# Patient Record
Sex: MALE | Race: WHITE
[De-identification: names, ages, dates, MRNs, and addresses within clinical notes are randomized per-mention and may not be internally consistent; named-entity substitution may affect disease eponyms.]

---

## 2018-03-25 ENCOUNTER — HOSPITAL ENCOUNTER (INPATIENT)
Dept: HOSPITAL 74 - YASAS | Age: 28
LOS: 4 days | Discharge: LEFT BEFORE DISCHARGE | DRG: 770 | End: 2018-03-29
Attending: INTERNAL MEDICINE | Admitting: INTERNAL MEDICINE
Payer: SELF-PAY

## 2018-03-25 VITALS — BODY MASS INDEX: 26.4 KG/M2

## 2018-03-25 DIAGNOSIS — F12.20: ICD-10-CM

## 2018-03-25 DIAGNOSIS — F17.210: ICD-10-CM

## 2018-03-25 DIAGNOSIS — F14.20: ICD-10-CM

## 2018-03-25 DIAGNOSIS — F10.230: ICD-10-CM

## 2018-03-25 DIAGNOSIS — G47.00: ICD-10-CM

## 2018-03-25 DIAGNOSIS — F11.23: Primary | ICD-10-CM

## 2018-03-25 PROCEDURE — HZ2ZZZZ DETOXIFICATION SERVICES FOR SUBSTANCE ABUSE TREATMENT: ICD-10-PCS | Performed by: INTERNAL MEDICINE

## 2018-03-25 NOTE — HP
COWS





- Scale


Resting Pulse: 0= MO 80 or Below


Sweatin= Chills/Flushing


Restless Observation: 5= Unable to Sit Still


Pupil Size: 1= Pupils >than Normal


Bone or Joint Aches: 4=Acute Joint/Muscle Pain


Runny Nose/ Eye Tearin= Nasal Congestion


GI Upset > 30mins: 2= Nausea/Diarrhea


Tremor Observation: 1= Tremor Felt, Not Seen


Yawning Observation: 0= None


Anxiety or Irritability: 2=Irritable/Anxious


Goose Flesh Skin: 0=Smooth Skin


COWS Score: 17





CIWA Score





- CIWA Score


Nausea/Vomitin-No Nausea/No Vomiting


Muscle Tremors: 1-None Visible, but Felt


Anxiety: 4-Mod. Anxious/Guarded


Agitation: 4-Moderately Restless


Paroxysmal Sweats: 3 (CHILLS)


Orientation: 1-Uncertain about Date


Tacttile Disturbances: 0-None


Auditory Disturbances: 0-None


Visual Disturbances: 0-None


Headache: 3-Moderate


CIWA-Ar Total Score: 16





Admission ROS S





- HPI


Chief Complaint: 





" I NEED TO STOP USING AND TO GET BETTER"


Allergies/Adverse Reactions: 


 Allergies











Allergy/AdvReac Type Severity Reaction Status Date / Time


 


No Known Allergies Allergy   Verified 18 22:54











History of Present Illness: 





27 Y.O. MALE WITH HX/O ALCOHOLISM AND HEROIN HERE FOR DETOX. CLIENT REPORTS 

THIS IS HIS FIRST TIME IN [facility]. REFERRED BY A FRIEND. DENIES ANY SIGNIFICANT 

PERIOD OF CLEAN TIME.


Exam Limitations: No Limitations





- Ebola screening


Have you traveled outside of the country in the last 21 days: No (N)


Have you had contact with anyone from an Ebola affected area: No


Have you been sick,other than usual withdrawal symptoms: No


Do you have a fever: No





- Review of Systems


Constitutional: Chills, Loss of Appetite, Malaise, Night Sweats, Changes in 

sleep


EENT: reports: Nose Congestion


Respiratory: reports: No Symptoms reported


Cardiac: reports: No Symptoms Reported


GI: reports: Diarrhea, Poor Appetite, Poor Fluid Intake, Abdominal cramping


: reports: No Symptoms Reported


Musculoskeletal: reports: Back Pain


Integumentary: reports: No Symptoms Reported


Neuro: reports: No Symptoms reported


Endocrine: reports: No Symptoms Reported


Hematology: reports: No Symptoms Reported


Psychiatric: reports: Anxious, Depressed


Other Systems: Reviewed and Negative





Patient History





- Patient Medical History


Hx Anemia: No


Hx Asthma: No


Hx Chronic Obstructive Pulmonary Disease (COPD): No


Hx Cancer: No


Hx Cardiac Disorders: No


Hx Congestive Heart Failure: No


Hx Hypertension: No


Hx Hypercholesterolemia: No


Hx Pacemaker: No


HX Cerebrovascular Accident: No


Hx Seizures: No


Hx Dementia: No


Hx Diabetes: No


Hx Gastrointestinal Disorders: No


Hx Liver Disease: No


Hx Genitourinary Disorders: No


Hx Sexually Transmitted Disorders: No


Hx Renal Disease (ESRD): No


Hx Thyroid Disease: No


Hx Human Immunodeficiency Virus (HIV): No (Last tested approx. 2 months ago: 

NEGATIVE.)


Hx Hepatitis C: No (Last tested approx. 2 months ago: NEGATIVE.)


Hx Depression: No


Hx Suicide Attempt: No (PATIENT DENIES CURRENT SI / HI.)


Hx Bipolar Disorder: No


Hx Schizophrenia: No


Other Medical History: ANXIETY DECLINES PSYCH EVAL





- Patient Surgical History


Past Surgical History: No


Hx Neurologic Surgery: No


Hx Cataract Extraction: No


Hx Cardiac Surgery: No


Hx Lung Surgery: No


Hx Breast Surgery: No


Hx Breast Biopsy: No


Hx Abdominal Surgery: No


Hx Appendectomy: No


Hx Cholecystectomy: No


Hx Genitourinary Surgery: No


Hx Orthopedic Surgery: No


Anesthesia Reaction: No





- PPD History


Previous Implant?: Yes


Documented Results: Negative w/o proof


Implanted On Prior [facility] Admission?: No


PPD to be Administered?: Yes





- Smoking Cessation


Smoking history: Current every day smoker


Have you smoked in the past 12 months: Yes


Aproximately how many cigarettes per day: 20


Cigars Per Day: 0


Hx Chewing Tobacco Use: No


Initiated information on smoking cessation: Yes


'Breaking Loose' booklet given: 18





- Substance & Tx. History


Hx Alcohol Use: Yes


Hx Substance Use: Yes


Substance Use Type: Alcohol, Heroin


Hx Substance Use Treatment: No





- Substances Abused


  ** Heroin


Route: Inhalation


Frequency: Daily


Amount used: 10 BAGS


Age of first use: 26


Date of Last Use: 18





  ** Alcohol


Route: Oral


Frequency: Daily


Amount used: BEER 1 OF 6 PACK


Age of first use: 18


Date of Last Use: 18





  ** Marijuana/Hashish


Route: Smoking


Frequency: Daily


Amount used: 2 GRAMS


Age of first use: 18


Date of Last Use: 18





Family Disease History





- Family Disease History


Family Disease History: Diabetes: Grandparent, Sister





Admission Physical Exam [facility]





- Vital Signs


Vital Signs: 


 Vital Signs - 24 hr











  18





  21:03


 


Temperature 98.7 F


 


Pulse Rate 75


 


Respiratory 19





Rate 


 


Blood Pressure 129/72














- Physical


General Appearance: Yes: Mild Distress, Tremorous, Anxious


HEENTM: Yes: EOMI, Normocephalic, PEREZ, Pharynx Normal, Nasal Congestion


Respiratory: Yes: Chest Non-Tender, Lungs Clear, Normal Breath Sounds, No 

Respiratory Distress, No Accessory Muscle Use


Neck: Yes: No masses,lesions,Nodules, Supple, Trachea in good position


Breast: Yes: Breast Exam Deferred


Cardiology: Yes: Regular Rhythm, Regular Rate, S1, S2


Abdominal: Yes: Normal Bowel Sounds, Non Tender, Flat, Soft


Genitourinary: Yes: Within Normal Limits


Back: Yes: Normal Inspection


Musculoskeletal: Yes: Gait Steady


Extremities: Yes: Normal Range of Motion, Non-Tender, Tremors


Neurological: Yes: Alert, Motor Strength 5/5


Integumentary: Yes: Normal Color, Dry, Warm


Lymphatic: Yes: Within Normal Limits





- Diagnostic


(1) Alcohol dependence with uncomplicated withdrawal


Current Visit: Yes   Status: Acute   





(2) Opioid dependence with withdrawal


Current Visit: Yes   Status: Acute   





(3) Nicotine dependence


Current Visit: Yes   Status: Chronic   


Qualifiers: 


   Nicotine product type: cigarettes   Substance use status: uncomplicated   

Qualified Code(s): F17.210 - Nicotine dependence, cigarettes, uncomplicated   





Cleared for Admission [facility]





- Detox or Rehab


[facility] Level of Care: Medically Managed


Detox Regimen/Protocol: Methadone/Librium





[facility] Breath Alcohol Content


Breath Alcohol Content: 0





Urine Drug Screen





- Results


Drug Screen Negative: No


Urine Drug Screen Results: THC-Marijuana, OPI-Opiates

## 2018-03-26 LAB
ALBUMIN SERPL-MCNC: 3.7 G/DL (ref 3.4–5)
ALP SERPL-CCNC: 68 U/L (ref 45–117)
ALT SERPL-CCNC: 22 U/L (ref 12–78)
ANION GAP SERPL CALC-SCNC: 10 MMOL/L (ref 8–16)
APPEARANCE UR: CLEAR
AST SERPL-CCNC: 12 U/L (ref 15–37)
BILIRUB SERPL-MCNC: 0.6 MG/DL (ref 0.2–1)
BILIRUB UR STRIP.AUTO-MCNC: NEGATIVE MG/DL
BUN SERPL-MCNC: 14 MG/DL (ref 7–18)
CALCIUM SERPL-MCNC: 8.9 MG/DL (ref 8.5–10.1)
CHLORIDE SERPL-SCNC: 103 MMOL/L (ref 98–107)
CO2 SERPL-SCNC: 28 MMOL/L (ref 21–32)
COLOR UR: YELLOW
CREAT SERPL-MCNC: 0.8 MG/DL (ref 0.7–1.3)
DEPRECATED RDW RBC AUTO: 13.6 % (ref 11.9–15.9)
GLUCOSE SERPL-MCNC: 80 MG/DL (ref 74–106)
HCT VFR BLD CALC: 42.6 % (ref 35.4–49)
HGB BLD-MCNC: 14.5 GM/DL (ref 11.7–16.9)
KETONES UR QL STRIP: NEGATIVE
LEUKOCYTE ESTERASE UR QL STRIP.AUTO: NEGATIVE
MCH RBC QN AUTO: 29.9 PG (ref 25.7–33.7)
MCHC RBC AUTO-ENTMCNC: 34 G/DL (ref 32–35.9)
MCV RBC: 87.8 FL (ref 80–96)
NITRITE UR QL STRIP: NEGATIVE
PH UR: 5 [PH] (ref 5–8)
PLATELET # BLD AUTO: 186 K/MM3 (ref 134–434)
PMV BLD: 10.7 FL (ref 7.5–11.1)
POTASSIUM SERPLBLD-SCNC: 4.1 MMOL/L (ref 3.5–5.1)
PROT SERPL-MCNC: 7.1 G/DL (ref 6.4–8.2)
PROT UR QL STRIP: NEGATIVE
PROT UR QL STRIP: NEGATIVE
RBC # BLD AUTO: 4.85 M/MM3 (ref 4–5.6)
RBC # UR STRIP: NEGATIVE /UL
SODIUM SERPL-SCNC: 141 MMOL/L (ref 136–145)
SP GR UR: 1.02 (ref 1–1.03)
UROBILINOGEN UR STRIP-MCNC: 2 MG/DL (ref 0.2–1)
WBC # BLD AUTO: 8.7 K/MM3 (ref 4–10)

## 2018-03-26 RX ADMIN — ACETAMINOPHEN PRN MG: 325 TABLET ORAL at 22:36

## 2018-03-26 RX ADMIN — Medication SCH TAB: at 10:41

## 2018-03-26 RX ADMIN — NICOTINE SCH: 14 PATCH, EXTENDED RELEASE TRANSDERMAL at 10:42

## 2018-03-26 RX ADMIN — Medication SCH: at 22:55

## 2018-03-26 NOTE — PN
S CIWA





- CIWA Score


Nausea/Vomiting: 3


Muscle Tremors: 3


Anxiety: 3


Agitation: 3


Paroxysmal Sweats: 1-Minimal Palms Moist


Orientation: 0-Oriented


Tacttile Disturbances: 1-Very Mild Itch/Numbness


Auditory Disturbances: 1-Very Mild


Visual Disturbances: 0-None


Headache: 2-Mild


CIWA-Ar Total Score: 17





BHS COWS





- Scale


Resting Pulse: 1= MI 


Sweatin= Chills/Flushing


Restless Observation: 3= Extraneous Movement


Pupil Size: 1= Pupils >than Normal


Bone or Joint Aches: 2= Severe Diffuse Aches


Runny Nose/ Eye Tearin= Nasal Congestion


GI Upset > 30mins: 2= Nausea/Diarrhea


Tremor Observation of Outstretched Hands: 2= Slight Tremor Visible


Yawning Observation: 1= 1-2x During Session


Anxiety or Irritability: 2=Irritable/Anxious


Goose Flesh Skin: 0=Smooth Skin


COWS Score: 16





BHS Progress Note (SOAP)


Subjective: 





ALERT,IRRITABLE.ANXIOUS,INTERRUPTED SLEEP,TREMOR


Objective: 





18 10:38


 Vital Signs











Temperature  98.1 F   18 09:45


 


Pulse Rate  92 H  18 09:45


 


Respiratory Rate  16   18 09:45


 


Blood Pressure  116/72   18 09:45


 


O2 Sat by Pulse Oximetry (%)      








EKG NSR,NORMAL ECG


18 10:39


 Laboratory Last Values











WBC  8.7 K/mm3 (4.0-10.0)   18  07:00    


 


RBC  4.85 M/mm3 (4.00-5.60)   18  07:00    


 


Hgb  14.5 GM/dL (11.7-16.9)   18  07:00    


 


Hct  42.6 % (35.4-49)   18  07:00    


 


MCV  87.8 fl (80-96)   18  07:00    


 


MCH  29.9 pg (25.7-33.7)   18  07:00    


 


MCHC  34.0 g/dl (32.0-35.9)   18  07:00    


 


RDW  13.6 % (11.9-15.9)   18  07:00    


 


Plt Count  186 K/MM3 (134-434)   18  07:00    


 


MPV  10.7 fl (7.5-11.1)   18  07:00    


 


Sodium  141 mmol/L (136-145)   18  07:00    


 


Potassium  4.1 mmol/L (3.5-5.1)   18  07:00    


 


Chloride  103 mmol/L ()   18  07:00    


 


Carbon Dioxide  28 mmol/L (21-32)   18  07:00    


 


Anion Gap  10  (8-16)   18  07:00    


 


BUN  14 mg/dL (7-18)  D 18  07:00    


 


Creatinine  0.8 mg/dL (0.7-1.3)   18  07:00    


 


Creat Clearance w eGFR  > 60  (>60)   18  07:00    


 


Random Glucose  80 mg/dL ()   18  07:00    


 


Calcium  8.9 mg/dL (8.5-10.1)   18  07:00    


 


Total Bilirubin  0.6 mg/dL (0.2-1.0)  D 18  07:00    


 


AST  12 U/L (15-37)  L  18  07:00    


 


ALT  22 U/L (12-78)   18  07:00    


 


Total Protein  7.1 g/dl (6.4-8.2)   18  07:00    


 


Albumin  3.7 g/dl (3.4-5.0)   18  07:00    


 


Urine Color  Yellow   18  23:50    


 


Urine Appearance  Clear   18  23:50    


 


Urine pH  5.0  (5.0-8.0)  D 18  23:50    


 


Ur Specific Gravity  1.025  (1.001-1.035)   18  23:50    


 


Urine Protein  Negative  (NEGATIVE)   18  23:50    


 


Urine Glucose (UA)  Negative  (NEGATIVE)   18  23:50    


 


Urine Ketones  Negative  (NEGATIVE)   18  23:50    


 


Urine Blood  Negative  (NEGATIVE)   18  23:50    


 


Urine Nitrite  Negative  (NEGATIVE)   18  23:50    


 


Urine Bilirubin  Negative  (<2.0 mg/dL)   18  23:50    


 


Urine Urobilinogen  2.0 mg/dL (0.2-1.0)   18  23:50    


 


Ur Leukocyte Esterase  Negative  (NEGATIVE)   18  23:50    











18 10:39


RPR HIV PENDING


Assessment: 





18 10:39


WITHDRAWAL SYMPTOM


Plan: 





CONTINUE DETOX

## 2018-03-26 NOTE — EKG
Test Reason : 

Blood Pressure : ***/*** mmHG

Vent. Rate : 074 BPM     Atrial Rate : 074 BPM

   P-R Int : 176 ms          QRS Dur : 094 ms

    QT Int : 394 ms       P-R-T Axes : 067 069 028 degrees

   QTc Int : 437 ms

 

NORMAL SINUS RHYTHM

NORMAL ECG

NO PREVIOUS ECGS AVAILABLE

Confirmed by ANA LUISA LANDA MD ([id number]) on 3/26/2018 9:05:52 AM

 

Referred By:             Confirmed By:ANA LUISA LANDA MD

## 2018-03-27 RX ADMIN — NICOTINE SCH: 14 PATCH, EXTENDED RELEASE TRANSDERMAL at 10:31

## 2018-03-27 RX ADMIN — Medication SCH MG: at 22:33

## 2018-03-27 RX ADMIN — Medication SCH TAB: at 10:30

## 2018-03-27 RX ADMIN — CYCLOBENZAPRINE HYDROCHLORIDE PRN MG: 10 TABLET, FILM COATED ORAL at 10:30

## 2018-03-27 RX ADMIN — ZOLPIDEM TARTRATE PRN MG: 10 TABLET, FILM COATED ORAL at 22:35

## 2018-03-27 NOTE — PN
S CIWA





- CIWA Score


Nausea/Vomiting: 3


Muscle Tremors: 3


Anxiety: 3


Agitation: 3


Paroxysmal Sweats: 1-Minimal Palms Moist


Orientation: 0-Oriented


Tacttile Disturbances: 1-Very Mild Itch/Numbness


Auditory Disturbances: 1-Very Mild


Visual Disturbances: 0-None


Headache: 2-Mild


CIWA-Ar Total Score: 17





BHS Progress Note (SOAP)


Subjective: 





ALERT,IRRITABLE,ANXIOUS,INTERRUPTED SLEEP,TREMOR,PAIN IN THE BODY AND BACK


Objective: 





03/27/18 11:21


 Vital Signs











Temperature  98.1 F   03/27/18 09:58


 


Pulse Rate  92 H  03/27/18 09:58


 


Respiratory Rate  18   03/27/18 09:58


 


Blood Pressure  135/89   03/27/18 09:58


 


O2 Sat by Pulse Oximetry (%)      








EKG NSR,NORMAL ECG,


 Laboratory Last Values











WBC  8.7 K/mm3 (4.0-10.0)   03/26/18  07:00    


 


RBC  4.85 M/mm3 (4.00-5.60)   03/26/18  07:00    


 


Hgb  14.5 GM/dL (11.7-16.9)   03/26/18  07:00    


 


Hct  42.6 % (35.4-49)   03/26/18  07:00    


 


MCV  87.8 fl (80-96)   03/26/18  07:00    


 


MCH  29.9 pg (25.7-33.7)   03/26/18  07:00    


 


MCHC  34.0 g/dl (32.0-35.9)   03/26/18  07:00    


 


RDW  13.6 % (11.9-15.9)   03/26/18  07:00    


 


Plt Count  186 K/MM3 (134-434)   03/26/18  07:00    


 


MPV  10.7 fl (7.5-11.1)   03/26/18  07:00    


 


Sodium  141 mmol/L (136-145)   03/26/18  07:00    


 


Potassium  4.1 mmol/L (3.5-5.1)   03/26/18  07:00    


 


Chloride  103 mmol/L ()   03/26/18  07:00    


 


Carbon Dioxide  28 mmol/L (21-32)   03/26/18  07:00    


 


Anion Gap  10  (8-16)   03/26/18  07:00    


 


BUN  14 mg/dL (7-18)  D 03/26/18  07:00    


 


Creatinine  0.8 mg/dL (0.7-1.3)   03/26/18  07:00    


 


Creat Clearance w eGFR  > 60  (>60)   03/26/18  07:00    


 


Random Glucose  80 mg/dL ()   03/26/18  07:00    


 


Calcium  8.9 mg/dL (8.5-10.1)   03/26/18  07:00    


 


Total Bilirubin  0.6 mg/dL (0.2-1.0)  D 03/26/18  07:00    


 


AST  12 U/L (15-37)  L  03/26/18  07:00    


 


ALT  22 U/L (12-78)   03/26/18  07:00    


 


Alkaline Phosphatase  68 U/L ()   03/26/18  07:00    


 


Total Protein  7.1 g/dl (6.4-8.2)   03/26/18  07:00    


 


Albumin  3.7 g/dl (3.4-5.0)   03/26/18  07:00    


 


Urine Color  Yellow   03/25/18  23:50    


 


Urine Appearance  Clear   03/25/18  23:50    


 


Urine pH  5.0  (5.0-8.0)  D 03/25/18  23:50    


 


Ur Specific Gravity  1.025  (1.001-1.035)   03/25/18  23:50    


 


Urine Protein  Negative  (NEGATIVE)   03/25/18  23:50    


 


Urine Glucose (UA)  Negative  (NEGATIVE)   03/25/18  23:50    


 


Urine Ketones  Negative  (NEGATIVE)   03/25/18  23:50    


 


Urine Blood  Negative  (NEGATIVE)   03/25/18  23:50    


 


Urine Nitrite  Negative  (NEGATIVE)   03/25/18  23:50    


 


Urine Bilirubin  Negative  (<2.0 mg/dL)   03/25/18  23:50    


 


Urine Urobilinogen  2.0 mg/dL (0.2-1.0)   03/25/18  23:50    


 


Ur Leukocyte Esterase  Negative  (NEGATIVE)   03/25/18  23:50    


 


RPR Titer  Nonreactive  (NONREACTIVE)   03/26/18  07:00    


 


HIV 1&2 Antibody Screen  Negative   03/26/18  07:00    


 


HIV P24 Antigen  Negative   03/26/18  07:00    











Assessment: 





03/27/18 11:22


WITHDRAWAL SYMPTOM


Plan: 





CONTINUE DETOX,ENSURE PLUS 120 MLS PO BID AND GINGERALE TID WITH EACH MEAL

## 2018-03-27 NOTE — CONSULT
[facility] Psychiatric Consult





- Data


Date of interview: 03/27/18


Admission source: [facility]


Identifying data: Pt. is a 27 year old single male, Father of two, unemployed, 

and currently living with mother. This is one of multiple admissions for 

patient. Pt. admitted to  for alcohol, cannabis and heroin.


Substance Abuse History: Following information confirmed with Mr. Arreola:  - 

Smoking Cessation.  Smoking history: Current every day smoker.  Have you smoked 

in the past 12 months: Yes.  Aproximately how many cigarettes per day: 20.  

Cigars Per Day: 0.  Hx Chewing Tobacco Use: No.  Initiated information on 

smoking cessation: Yes.  - Substance & Tx. History.  Hx Alcohol Use: Yes.  Hx 

Substance Use: Yes.  Substance Use Type: Alcohol, Heroin.  Hx Substance Use 

Treatment: No.  - Substances Abused.  ** Heroin.  Route: Inhalation.  Frequency

: Daily.  Amount used: 10 BAGS.  Age of first use: 26.  Date of Last Use: 03/25/ 18.  ** Alcohol.  Route: Oral.  Frequency: Daily.  Amount used: BEER 1 OF 6 

PACK.  Age of first use: 18.  Date of Last Use: 03/23/18.  ** Marijuana/

Hashish.  Route: Smoking.  Frequency: Daily.  Amount used: 2 GRAMS.  Age of 

first use: 18.  Date of Last Use: 03/25/18


Psychiatric History: Pt. denies h/o psychiatric hospitalizations, suicide 

attempt, and outpatient care.


Physical/Sexual Abuse/Trauma History: Denies.





Mental Status Exam





- Mental Status Exam


Alert and Oriented to: Time, Place, Person


Cognitive Function: Good


Patient Appearance: Unkempt


Mood: Euthymic


Affect: Appropriate


Patient Behavior: Fatigued (Pt. reports poor sleep.), Appropriate, Cooperative


Speech Pattern: Appropriate


Voice Loudness: Normal


Thought Process: Goal Oriented


Thought Disorder: Not Present


Hallucinations: Denies


Suicidal Ideation: Denies


Homicidal Ideation: Denies


Insight/Judgement: Poor


Sleep: Poorly


Appetite: Fair


Muscle strength/Tone: Normal


Gait/Station: Normal





Psychiatric Findings





- Problem List (Axis 1, 2,3)


(1) Insomnia


Current Visit: Yes   Status: Acute   





(2) Alcohol dependence with uncomplicated withdrawal


Current Visit: Yes   Status: Acute   





(3) Cannabis dependence, uncomplicated


Current Visit: Yes   Status: Acute   





(4) Cocaine dependence, uncomplicated


Current Visit: Yes   Status: Acute   





(5) Nicotine dependence


Current Visit: Yes   Status: Chronic   


Qualifiers: 


   Nicotine product type: cigarettes   Substance use status: uncomplicated   

Qualified Code(s): F17.210 - Nicotine dependence, cigarettes, uncomplicated   





(6) Opioid dependence with withdrawal


Current Visit: Yes   Status: Acute   





- Initial Treatment Plan


Initial Treatment Plan: Psychoeducation provided. Detoxification provided. 

Ambien 10mg qhs prn ordered for insomnia. Benefits and side effects discussed. 

Pt made aware of the risk of parasomnia. Verbal consent given. Will continue to 

monitor.

## 2018-03-28 VITALS — TEMPERATURE: 97.7 F

## 2018-03-28 RX ADMIN — ZOLPIDEM TARTRATE PRN MG: 10 TABLET, FILM COATED ORAL at 22:38

## 2018-03-28 RX ADMIN — Medication SCH TAB: at 11:15

## 2018-03-28 RX ADMIN — NICOTINE SCH: 14 PATCH, EXTENDED RELEASE TRANSDERMAL at 11:18

## 2018-03-28 RX ADMIN — Medication SCH MG: at 22:38

## 2018-03-28 RX ADMIN — CYCLOBENZAPRINE HYDROCHLORIDE PRN MG: 10 TABLET, FILM COATED ORAL at 11:15

## 2018-03-28 NOTE — PN
[facility] Progress Note (SOAP)


Subjective: 





ALERT,IRRITABLE,ANXIOUS,INTERRUPTED SLEEP,PAIN IN THE BODY AND BACK


Objective: 





03/28/18 10:16


 Vital Signs











Temperature  97.5 F L  03/28/18 06:33


 


Pulse Rate  68   03/28/18 06:33


 


Respiratory Rate  16   03/28/18 06:33


 


Blood Pressure  113/68   03/28/18 06:33


 


O2 Sat by Pulse Oximetry (%)      











Assessment: 





03/28/18 10:17


WITHDRAWAL SYMPTOM BUT LESS


Plan: 





CONTINUE DETOX,MEDICATION ADJUST,

## 2018-03-29 VITALS — HEART RATE: 98 BPM | DIASTOLIC BLOOD PRESSURE: 69 MMHG | SYSTOLIC BLOOD PRESSURE: 103 MMHG

## 2018-03-29 RX ADMIN — ACETAMINOPHEN PRN MG: 325 TABLET ORAL at 08:46

## 2018-03-29 NOTE — PN
BHS Progress Note (SOAP)


Subjective: 





ALERT,INTERRUPTED SLEEP


Objective: 





03/29/18 09:04


 Vital Signs











Temperature  97.7 F   03/29/18 06:00


 


Pulse Rate  72   03/29/18 06:00


 


Respiratory Rate  18   03/29/18 06:00


 


Blood Pressure  105/59   03/29/18 06:00


 


O2 Sat by Pulse Oximetry (%)      











Assessment: 





03/29/18 09:04


WITHDRAWAL SYMPTOM


Plan: 





CONTINUE DETOX

## 2018-03-29 NOTE — DS
[facility] Detox Discharge Summary


Admission Date: 


18





Discharge Date: 18





- History


Present History: Alcohol Dependence, Cannabis Dependence, Cocaine Dependence, 

Opioid Dependence


Additional Comments: 





PATIENT DID NOT WANT TO COMPLETE TREATMENT,SIGNED RELEASE AMA,STATED HAS TO GO 

TO  OF HIS AUNT,SEEN BY COUNSELOR,SIGNED RELEASE AMA,FOLLOW UP WITH 

AFTER CARE PROGRAM AS ARRANGEMENT


Pertinent Past History: 





INSOMNIA





- Physical Exam Results


Vital Signs: 


 Vital Signs











Temperature  97.7 F   18 06:00


 


Pulse Rate  72   18 06:00


 


Respiratory Rate  18   18 06:00


 


Blood Pressure  105/59   18 06:00


 


O2 Sat by Pulse Oximetry (%)      











Pertinent Admission Physical Exam Findings: 





WITHDRAWAL SYMPTOM AND SIGNS


 Laboratory Last Values











WBC  8.7 K/mm3 (4.0-10.0)   18  07:00    


 


RBC  4.85 M/mm3 (4.00-5.60)   18  07:00    


 


Hgb  14.5 GM/dL (11.7-16.9)   18  07:00    


 


Hct  42.6 % (35.4-49)   18  07:00    


 


MCV  87.8 fl (80-96)   18  07:00    


 


MCH  29.9 pg (25.7-33.7)   18  07:00    


 


MCHC  34.0 g/dl (32.0-35.9)   18  07:00    


 


RDW  13.6 % (11.9-15.9)   18  07:00    


 


Plt Count  186 K/MM3 (134-434)   18  07:00    


 


MPV  10.7 fl (7.5-11.1)   18  07:00    


 


Sodium  141 mmol/L (136-145)   18  07:00    


 


Potassium  4.1 mmol/L (3.5-5.1)   18  07:00    


 


Chloride  103 mmol/L ()   18  07:00    


 


Carbon Dioxide  28 mmol/L (21-32)   18  07:00    


 


Anion Gap  10  (8-16)   18  07:00    


 


BUN  14 mg/dL (7-18)  D 18  07:00    


 


Creatinine  0.8 mg/dL (0.7-1.3)   18  07:00    


 


Creat Clearance w eGFR  > 60  (>60)   18  07:00    


 


Random Glucose  80 mg/dL ()   18  07:00    


 


Calcium  8.9 mg/dL (8.5-10.1)   18  07:00    


 


Total Bilirubin  0.6 mg/dL (0.2-1.0)  D 18  07:00    


 


AST  12 U/L (15-37)  L  18  07:00    


 


ALT  22 U/L (12-78)   18  07:00    


 


Alkaline Phosphatase  68 U/L ()   18  07:00    


 


Total Protein  7.1 g/dl (6.4-8.2)   18  07:00    


 


Albumin  3.7 g/dl (3.4-5.0)   18  07:00    


 


Urine Color  Yellow   18  23:50    


 


Urine Appearance  Clear   18  23:50    


 


Urine pH  5.0  (5.0-8.0)  D 18  23:50    


 


Ur Specific Gravity  1.025  (1.001-1.035)   18  23:50    


 


Urine Protein  Negative  (NEGATIVE)   18  23:50    


 


Urine Glucose (UA)  Negative  (NEGATIVE)   18  23:50    


 


Urine Ketones  Negative  (NEGATIVE)   18  23:50    


 


Urine Blood  Negative  (NEGATIVE)   18  23:50    


 


Urine Nitrite  Negative  (NEGATIVE)   18  23:50    


 


Urine Bilirubin  Negative  (<2.0 mg/dL)   18  23:50    


 


Urine Urobilinogen  2.0 mg/dL (0.2-1.0)   18  23:50    


 


Ur Leukocyte Esterase  Negative  (NEGATIVE)   18  23:50    


 


RPR Titer  Nonreactive  (NONREACTIVE)   18  07:00    


 


HIV 1&2 Antibody Screen  Negative   18  07:00    


 


HIV P24 Antigen  Negative   18  07:00    








 Vital Signs











Temperature  97.7 F   18 06:00


 


Pulse Rate  72   18 06:00


 


Respiratory Rate  18   18 06:00


 


Blood Pressure  105/59   18 06:00


 


O2 Sat by Pulse Oximetry (%)      














- Treatment


Patient has Accepted a Rehab Referral to: [facility]





- Medication


Discharge Medications: 


Ambulatory Orders





NK [No Known Home Medication]  10/26/17 











- Diagnosis


(1) Opioid dependence with withdrawal


Current Visit: Yes   Status: Acute   





(2) Alcohol dependence with uncomplicated withdrawal


Current Visit: Yes   Status: Acute   





(3) Cannabis dependence, uncomplicated


Current Visit: Yes   Status: Acute   





(4) Cocaine dependence, uncomplicated


Current Visit: Yes   Status: Acute   





(5) Nicotine dependence


Current Visit: Yes   Status: Chronic   


Qualifiers: 


   Nicotine product type: cigarettes   Substance use status: uncomplicated   

Qualified Code(s): F17.210 - Nicotine dependence, cigarettes, uncomplicated   





(6) Insomnia


Current Visit: Yes   Status: Acute   





- AMA


Did Patient Leave Against Medical Advice: Yes

## 2018-11-28 ENCOUNTER — HOSPITAL ENCOUNTER (INPATIENT)
Dept: HOSPITAL 74 - YASAS | Age: 28
LOS: 3 days | Discharge: LEFT BEFORE DISCHARGE | DRG: 770 | End: 2018-12-01
Attending: INTERNAL MEDICINE | Admitting: INTERNAL MEDICINE
Payer: COMMERCIAL

## 2018-11-28 VITALS — BODY MASS INDEX: 25.8 KG/M2

## 2018-11-28 DIAGNOSIS — F17.210: ICD-10-CM

## 2018-11-28 DIAGNOSIS — F14.20: ICD-10-CM

## 2018-11-28 DIAGNOSIS — F11.23: Primary | ICD-10-CM

## 2018-11-28 DIAGNOSIS — F10.230: ICD-10-CM

## 2018-11-28 DIAGNOSIS — F12.20: ICD-10-CM

## 2018-11-28 NOTE — HP
COWS





- Scale


Resting Pulse: 1= CA 


Sweatin=Flushed/Facial Moisture


Restless Observation: 1= Difficult to Sit Still


Pupil Size: 0= Normal to Room Light


Bone or Joint Aches: 4=Acute Joint/Muscle Pain


Runny Nose/ Eye Tearin= Runny Nose/Eyes


GI Upset > 30mins: 2= Nausea/Diarrhea (X 2)


Tremor Observation: 4= Gross Tremor/Twitching


Yawning Observation: 0= None


Anxiety or Irritability: 2=Irritable/Anxious


Goose Flesh Skin: 0=Smooth Skin


COWS Score: 18





CIWA Score


Nausea/Vomiting: 3 (VOMITING X 2)


Muscle Tremors: 4-Moderate,w/Arms Extend


Anxiety: 1-Mildly Anxious


Agitation: 3


Paroxysmal Sweats: 1-Minimal Palms Moist


Orientation: 0-Oriented


Tacttile Disturbances: 0-None


Auditory Disturbances: 3-Moderate Harsh/Frighten


Visual Disturbances: 0-None


Headache: 4-Moderately Severe


CIWA-Ar Total Score: 19





- Admission Criteria


[facility] Guidelines: Admission for Medically Managed Detox: 


Requires at least one of the followin. CIWA greater than 12


2. Seizures within the past 24 hours


3. Delirium tremens within the past 24 hours


4. Hallucinations within the past 24 hours


5. Acute intervention needed for co  occurring medical disorder


6. Acute intervention needed for co  occurring psychiatric disorder


7. Severe withdrawal that cannot be handled at a lower level of care (continued


    vomiting, continued diarrhea, abnormal vital signs) requiring intravenous


    medication and/or fluids


8. Pregnancy








Admission ROS [facility]


Allergies/Adverse Reactions: 


 Allergies











Allergy/AdvReac Type Severity Reaction Status Date / Time


 


No Known Allergies Allergy   Verified 18 23:07














- Ebola screening


Have you traveled outside of the country in the last 21 days: No (N)


Have you had contact with anyone from an Ebola affected area: No


Have you been sick,other than usual withdrawal symptoms: No


Do you have a fever: No





Patient History





- Patient Medical History


Hx Anemia: No


Hx Asthma: No


Hx Chronic Obstructive Pulmonary Disease (COPD): No


Hx Cancer: No


Hx Cardiac Disorders: No


Hx Congestive Heart Failure: No


Hx Hypertension: No


Hx Hypercholesterolemia: No


Hx Pacemaker: No


HX Cerebrovascular Accident: No


Hx Seizures: No


Hx Dementia: No


Hx Diabetes: No


Hx Gastrointestinal Disorders: No


Hx Liver Disease: No


Hx Genitourinary Disorders: No


Hx Sexually Transmitted Disorders: No


Hx Renal Disease (ESRD): No


Hx Thyroid Disease: No


Hx Human Immunodeficiency Virus (HIV): No (Last tested approx. 2 months ago: 

NEGATIVE.)


Hx Hepatitis C: No (Last tested approx. 2 months ago: NEGATIVE.)


Hx Depression: No


Hx Suicide Attempt: No (PATIENT DENIES CURRENT SI / HI.)


Hx Bipolar Disorder: No


Hx Schizophrenia: No





- Patient Surgical History


Past Surgical History: No


Hx Neurologic Surgery: No


Hx Cataract Extraction: No


Hx Cardiac Surgery: No


Hx Lung Surgery: No


Hx Breast Surgery: No


Hx Breast Biopsy: No


Hx Abdominal Surgery: No


Hx Appendectomy: No


Hx Cholecystectomy: No


Hx Genitourinary Surgery: No


Hx Orthopedic Surgery: No


Anesthesia Reaction: No





- PPD History


Date: 18





- Smoking Cessation


Smoking history: Current every day smoker


Have you smoked in the past 12 months: Yes


Aproximately how many cigarettes per day: 20


Cigars Per Day: 0


Hx Chewing Tobacco Use: No





Family Disease History





- Family Disease History


Family Disease History: Diabetes: Grandparent, Sister





Admission Physical Exam [facility]





- Vital Signs


Vital Signs: 


 Vital Signs - 24 hr











  18





  22:24


 


Temperature 96.9 F L


 


Pulse Rate 96 H


 


Respiratory 18





Rate 


 


Blood Pressure 133/83














- Diagnostic


(1) Alcohol dependence with uncomplicated withdrawal


Current Visit: Yes   Status: Chronic   





(2) Nicotine dependence


Current Visit: Yes   Status: Chronic   


Qualifiers: 


   Nicotine product type: cigarettes   Substance use status: uncomplicated   

Qualified Code(s): F17.210 - Nicotine dependence, cigarettes, uncomplicated   





(3) Opioid dependence with withdrawal


Current Visit: Yes   Status: Acute   





(4) Cannabis dependence, uncomplicated


Current Visit: Yes   Status: Chronic   





BHS Breath Alcohol Content


Breath Alcohol Content: 0





Urine Drug Screen





- Results


Drug Screen Negative: No


Urine Drug Screen Results: THC-Marijuana, OPI-Opiates, FEN-Fentanyl

## 2018-11-28 NOTE — HP
COWS





- Scale


Resting Pulse: 1= MD 


Sweatin=Flushed/Facial Moisture


Restless Observation: 1= Difficult to Sit Still


Pupil Size: 0= Normal to Room Light


Bone or Joint Aches: 4=Acute Joint/Muscle Pain


Runny Nose/ Eye Tearin= Runny Nose/Eyes


GI Upset > 30mins: 2= Nausea/Diarrhea (X 2)


Tremor Observation: 4= Gross Tremor/Twitching


Yawning Observation: 0= None


Anxiety or Irritability: 2=Irritable/Anxious


Goose Flesh Skin: 0=Smooth Skin


COWS Score: 18





CIWA Score


Nausea/Vomiting: 3 (VOMITING X 2)


Muscle Tremors: 4-Moderate,w/Arms Extend


Anxiety: 1-Mildly Anxious


Agitation: 3


Paroxysmal Sweats: 1-Minimal Palms Moist


Orientation: 0-Oriented


Tacttile Disturbances: 0-None


Auditory Disturbances: 3-Moderate Harsh/Frighten


Visual Disturbances: 0-None


Headache: 4-Moderately Severe


CIWA-Ar Total Score: 19





- Admission Criteria


[facility] Guidelines: Admission for Medically Managed Detox: 


Requires at least one of the followin. CIWA greater than 12


2. Seizures within the past 24 hours


3. Delirium tremens within the past 24 hours


4. Hallucinations within the past 24 hours


5. Acute intervention needed for co  occurring medical disorder


6. Acute intervention needed for co  occurring psychiatric disorder


7. Severe withdrawal that cannot be handled at a lower level of care (continued


    vomiting, continued diarrhea, abnormal vital signs) requiring intravenous


    medication and/or fluids


8. Pregnancy








Admission ROS [facility]


Allergies/Adverse Reactions: 


 Allergies











Allergy/AdvReac Type Severity Reaction Status Date / Time


 


No Known Allergies Allergy   Verified 18 02:53











History of Present Illness: 





27 YEARS OLD MALE WITH  EIGHT YEARS OF ALCOHOL AND HEROIN DEPENDENCE IS SEEKING 

ADMISSION TO DETOX. PATIENT HAS BEEN IN  PREVIOUS DETOX AND REPORTS 2 YEARS 

PERIOD OF SOBRIETY. HE DENIES PAST MEDICAL HISTORY, SUICIDE ATTEMPT AND 

SUICIDAL IDEATION AT THIS TIME





- Ebola screening


Have you traveled outside of the country in the last 21 days: No (N)


Have you had contact with anyone from an Ebola affected area: No


Have you been sick,other than usual withdrawal symptoms: No


Do you have a fever: No





- Review of Systems


Constitutional: Chills, Loss of Appetite, Night Sweats, Weakness


EENT: reports: Cataracts, Nose Congestion


Respiratory: reports: No Symptoms reported


Cardiac: reports: No Symptoms Reported


GI: reports: No Symptoms Reported


: reports: No Symptoms Reported


Musculoskeletal: reports: Back Pain, Muscle Pain


Integumentary: reports: Dryness


Neuro: reports: Tremors


Hematology: reports: No Symptoms Reported


Psychiatric: reports: No Sypmtoms Reported, Judgement Intact, Orientated x3


Other Systems: Reviewed and Negative





Patient History





- Patient Medical History


Hx Anemia: No


Hx Asthma: No


Hx Chronic Obstructive Pulmonary Disease (COPD): No


Hx Cancer: No


Hx Cardiac Disorders: No


Hx Congestive Heart Failure: No


Hx Hypertension: No


Hx Hypercholesterolemia: No


Hx Pacemaker: No


HX Cerebrovascular Accident: No


Hx Seizures: No


Hx Dementia: No


Hx Diabetes: No


Hx Gastrointestinal Disorders: No


Hx Liver Disease: No


Hx Genitourinary Disorders: No


Hx Sexually Transmitted Disorders: No


Hx Renal Disease (ESRD): No


Hx Thyroid Disease: No


Hx Human Immunodeficiency Virus (HIV): No (Last tested approx. 2 months ago: 

NEGATIVE.)


Hx Hepatitis C: No (Last tested approx. 2 months ago: NEGATIVE.)


Hx Depression: No


Hx Suicide Attempt: No (PATIENT DENIES CURRENT SI / HI.)


Hx Bipolar Disorder: No


Hx Schizophrenia: No





- Patient Surgical History


Past Surgical History: No


Hx Neurologic Surgery: No


Hx Cataract Extraction: No


Hx Cardiac Surgery: No


Hx Lung Surgery: No


Hx Breast Surgery: No


Hx Breast Biopsy: No


Hx Abdominal Surgery: No


Hx Appendectomy: No


Hx Cholecystectomy: No


Hx Genitourinary Surgery: No


Hx Orthopedic Surgery: No


Anesthesia Reaction: No





- PPD History


Date: 18





- Smoking Cessation


Smoking history: Current every day smoker


Have you smoked in the past 12 months: Yes


Aproximately how many cigarettes per day: 20


Cigars Per Day: 0


Hx Chewing Tobacco Use: No


Initiated information on smoking cessation: Yes


'Breaking Loose' booklet given: 18





Family Disease History





- Family Disease History


Family Disease History: Diabetes: Grandparent, Sister





Admission Physical Exam [facility]





- Vital Signs


Vital Signs: 


 Vital Signs - 24 hr











  18





  22:24


 


Temperature 96.9 F L


 


Pulse Rate 96 H


 


Respiratory 18





Rate 


 


Blood Pressure 133/83














- Physical


General Appearance: Yes: Moderate Distress, Alcohol on Breath, Tremorous, 

Sweating, Anxious


HEENTM: Yes: EOMI, Normal ENT Inspection, Normocephalic, Normal Voice, PEREZ


Respiratory: Yes: Lungs Clear, Normal Breath Sounds, No Respiratory Distress


Neck: Yes: Supple


Breast: Yes: Breast Exam Deferred


Cardiology: Yes: Regular Rhythm, Regular Rate


Abdominal: Yes: Normal Bowel Sounds


Genitourinary: Yes: Within Normal Limits


Back: Yes: CVA Tenderness (L)


Musculoskeletal: Yes: Within Normal Limits


Extremities: Yes: Tremors


Neurological: Yes: Alert, Normal Mood/Affect


Integumentary: Yes: Within Normal Limits


Lymphatic: Yes: Within Normal Limits





- Diagnostic


(1) Alcohol dependence with uncomplicated withdrawal


Current Visit: Yes   Status: Acute   





(2) Nicotine dependence


Current Visit: Yes   Status: Chronic   


Qualifiers: 


   Nicotine product type: cigarettes   Substance use status: uncomplicated   

Qualified Code(s): F17.210 - Nicotine dependence, cigarettes, uncomplicated   





(3) Opioid dependence with withdrawal


Current Visit: Yes   Status: Acute   





(4) Cannabis dependence, uncomplicated


Current Visit: Yes   Status: Chronic   





Cleared for Admission [facility]





- Detox or Rehab


[facility] Level of Care: Medically Managed


Detox Regimen/Protocol: Methadone/Librium





[facility] Breath Alcohol Content


Breath Alcohol Content: 0





Urine Drug Screen





- Results


Drug Screen Negative: No


Urine Drug Screen Results: THC-Marijuana, OPI-Opiates, FEN-Fentanyl

## 2018-11-29 LAB
ALBUMIN SERPL-MCNC: 3.5 G/DL (ref 3.4–5)
ALP SERPL-CCNC: 84 U/L (ref 45–117)
ALT SERPL-CCNC: 27 U/L (ref 13–61)
ANION GAP SERPL CALC-SCNC: 9 MMOL/L (ref 8–16)
APPEARANCE UR: CLEAR
AST SERPL-CCNC: 15 U/L (ref 15–37)
BILIRUB SERPL-MCNC: 0.4 MG/DL (ref 0.2–1)
BILIRUB UR STRIP.AUTO-MCNC: NEGATIVE MG/DL
BUN SERPL-MCNC: 14 MG/DL (ref 7–18)
CALCIUM SERPL-MCNC: 8.9 MG/DL (ref 8.5–10.1)
CHLORIDE SERPL-SCNC: 106 MMOL/L (ref 98–107)
CO2 SERPL-SCNC: 25 MMOL/L (ref 21–32)
COLOR UR: YELLOW
CREAT SERPL-MCNC: 0.8 MG/DL (ref 0.55–1.3)
DEPRECATED RDW RBC AUTO: 13.8 % (ref 11.9–15.9)
GLUCOSE SERPL-MCNC: 77 MG/DL (ref 74–106)
HCT VFR BLD CALC: 40.9 % (ref 35.4–49)
HGB BLD-MCNC: 14.3 GM/DL (ref 11.7–16.9)
KETONES UR QL STRIP: NEGATIVE
LEUKOCYTE ESTERASE UR QL STRIP.AUTO: NEGATIVE
MCH RBC QN AUTO: 30.1 PG (ref 25.7–33.7)
MCHC RBC AUTO-ENTMCNC: 35.1 G/DL (ref 32–35.9)
MCV RBC: 85.9 FL (ref 80–96)
NITRITE UR QL STRIP: NEGATIVE
PH UR: 5 [PH] (ref 5–8)
PLATELET # BLD AUTO: 178 K/MM3 (ref 134–434)
PMV BLD: 10.5 FL (ref 7.5–11.1)
POTASSIUM SERPLBLD-SCNC: 3.9 MMOL/L (ref 3.5–5.1)
PROT SERPL-MCNC: 7 G/DL (ref 6.4–8.2)
PROT UR QL STRIP: NEGATIVE
PROT UR QL STRIP: NEGATIVE
RBC # BLD AUTO: 4.77 M/MM3 (ref 4–5.6)
SODIUM SERPL-SCNC: 140 MMOL/L (ref 136–145)
SP GR UR: 1.03 (ref 1.01–1.03)
UROBILINOGEN UR STRIP-MCNC: NEGATIVE MG/DL (ref 0.2–1)
WBC # BLD AUTO: 8.2 K/MM3 (ref 4–10)

## 2018-11-29 PROCEDURE — HZ2ZZZZ DETOXIFICATION SERVICES FOR SUBSTANCE ABUSE TREATMENT: ICD-10-PCS | Performed by: INTERNAL MEDICINE

## 2018-11-29 RX ADMIN — NICOTINE SCH: 14 PATCH, EXTENDED RELEASE TRANSDERMAL at 10:24

## 2018-11-29 RX ADMIN — Medication SCH MG: at 22:26

## 2018-11-29 RX ADMIN — Medication SCH TAB: at 10:23

## 2018-11-29 NOTE — PN
S CIWA





- CIWA Score


Nausea/Vomitin


Muscle Tremors: 4-Moderate,w/Arms Extend


Anxiety: 4-Mod. Anxious/Guarded


Agitation: 2


Paroxysmal Sweats: 3


Orientation: 0-Oriented


Tacttile Disturbances: 1-Very Mild Itch/Numbness


Auditory Disturbances: 0-None


Visual Disturbances: 0-None


Headache: 1-Very Mild


CIWA-Ar Total Score: 17





B[facility] COWS





- Scale


Resting Pulse: 1= LA 


Sweatin= Chills/Flushing


Restless Observation: 3= Extraneous Movement


Pupil Size: 0= Normal to Room Light


Bone or Joint Aches: 2= Severe Diffuse Aches


Runny Nose/ Eye Tearin= Runny Nose/Eyes


GI Upset > 30mins: 3= Vomiting/Diarrhea


Tremor Observation of Outstretched Hands: 2= Slight Tremor Visible


Yawning Observation: 1= 1-2x During Session


Anxiety or Irritability: 2=Irritable/Anxious


Goose Flesh Skin: 0=Smooth Skin


COWS Score: 17





[facility] Progress Note (SOAP)


Subjective: 





Sweating, tremor, anxious, interrupted sleep, feeling tired


Objective: 





18 13:40


 Last Vital Signs











Temp Pulse Resp BP Pulse Ox


 


 98.4 F   83   16   88/54 L   


 


 18 13:31  18 13:31  18 13:31  18 13:31   








Hypotension noted (b/p: 88/54)





 Laboratory Tests











  18





  07:00 07:00 07:00


 


WBC  8.2  


 


RBC  4.77  


 


Hgb  14.3  


 


Hct  40.9  


 


MCV  85.9  


 


MCH  30.1  


 


MCHC  35.1  


 


RDW  13.8  


 


Plt Count  178  D  


 


MPV  10.5  


 


Sodium   140 


 


Potassium   3.9 


 


Chloride   106 


 


Carbon Dioxide   25 


 


Anion Gap   9 


 


BUN   14 


 


Creatinine   0.8 


 


Creat Clearance w eGFR   > 60 


 


Random Glucose   77 


 


Calcium   8.9 


 


Total Bilirubin   0.4 


 


AST   15 


 


ALT   27 


 


Alkaline Phosphatase   84 


 


Total Protein   7.0 


 


Albumin   3.5 


 


RPR Titer    Nonreactive








Labs reviewed


Assessment: 





18 13:40


Withdrawal symptoms


Plan: 





Continue detox


Encouraged PO water intake





Follow up on UA result (pending)

## 2018-11-30 RX ADMIN — NICOTINE SCH: 14 PATCH, EXTENDED RELEASE TRANSDERMAL at 10:35

## 2018-11-30 RX ADMIN — Medication SCH TAB: at 10:34

## 2018-11-30 RX ADMIN — Medication SCH MG: at 22:11

## 2018-11-30 NOTE — PN
[facility] CIWA





- CIWA Score


Nausea/Vomitin


Muscle Tremors: 3


Anxiety: 3


Agitation: 3


Paroxysmal Sweats: 3


Orientation: 0-Oriented


Tacttile Disturbances: 0-None


Auditory Disturbances: 0-None


Visual Disturbances: 0-None


Headache: 0-None Present


CIWA-Ar Total Score: 14





[facility] COWS





- Scale


Resting Pulse: 2= -120


Sweatin= Chills/Flushing


Restless Observation: 3= Extraneous Movement


Pupil Size: 0= Normal to Room Light


Bone or Joint Aches: 1= Mild Discomfort


Runny Nose/ Eye Tearin= Runny Nose/Eyes


GI Upset > 30mins: 2= Nausea/Diarrhea


Tremor Observation of Outstretched Hands: 2= Slight Tremor Visible


Yawning Observation: 0= None


Anxiety or Irritability: 1=Feels Anxious/Irritable


Goose Flesh Skin: 0=Smooth Skin


COWS Score: 14





B[facility] Progress Note (SOAP)


Subjective: 





Chills, sweating, stomach ache, runny nose, interrupted sleep, nausea


Objective: 





18 15:15


 Last Vital Signs











Temp Pulse Resp BP Pulse Ox


 


 97.2 F L  114 H  16   105/76    


 


 18 10:18  18 10:18  18 10:18  18 10:18   





tachycardia due to withdrawal


 Laboratory Tests











  18





  07:00 07:00 07:00


 


WBC  8.2  


 


RBC  4.77  


 


Hgb  14.3  


 


Hct  40.9  


 


MCV  85.9  


 


MCH  30.1  


 


MCHC  35.1  


 


RDW  13.8  


 


Plt Count  178  D  


 


MPV  10.5  


 


Sodium   140 


 


Potassium   3.9 


 


Chloride   106 


 


Carbon Dioxide   25 


 


Anion Gap   9 


 


BUN   14 


 


Creatinine   0.8 


 


Creat Clearance w eGFR   > 60 


 


Random Glucose   77 


 


Calcium   8.9 


 


Total Bilirubin   0.4 


 


AST   15 


 


ALT   27 


 


Alkaline Phosphatase   84 


 


Total Protein   7.0 


 


Albumin   3.5 


 


Urine Color   


 


Urine Appearance   


 


Urine pH   


 


Ur Specific Gravity   


 


Urine Protein   


 


Urine Glucose (UA)   


 


Urine Ketones   


 


Urine Blood   


 


Urine Nitrite   


 


Urine Bilirubin   


 


Urine Urobilinogen   


 


Ur Leukocyte Esterase   


 


RPR Titer    Nonreactive


 


HIV 1&2 Antibody Screen   


 


HIV P24 Antigen   














  18





  13:00 08:00


 


WBC  


 


RBC  


 


Hgb  


 


Hct  


 


MCV  


 


MCH  


 


MCHC  


 


RDW  


 


Plt Count  


 


MPV  


 


Sodium  


 


Potassium  


 


Chloride  


 


Carbon Dioxide  


 


Anion Gap  


 


BUN  


 


Creatinine  


 


Creat Clearance w eGFR  


 


Random Glucose  


 


Calcium  


 


Total Bilirubin  


 


AST  


 


ALT  


 


Alkaline Phosphatase  


 


Total Protein  


 


Albumin  


 


Urine Color  Yellow 


 


Urine Appearance  Clear 


 


Urine pH  5.0 


 


Ur Specific Gravity  1.028 


 


Urine Protein  Negative 


 


Urine Glucose (UA)  Negative 


 


Urine Ketones  Negative 


 


Urine Blood  Negative 


 


Urine Nitrite  Negative 


 


Urine Bilirubin  Negative 


 


Urine Urobilinogen  Negative 


 


Ur Leukocyte Esterase  Negative 


 


RPR Titer  


 


HIV 1&2 Antibody Screen   Negative


 


HIV P24 Antigen   Negative








Labs reviewed


Assessment: 





18 15:15


Withdrawal symptoms


Plan: 





Continue detox


Encouraged PO water intake

## 2018-12-01 VITALS — SYSTOLIC BLOOD PRESSURE: 122 MMHG | TEMPERATURE: 97.5 F | HEART RATE: 74 BPM | DIASTOLIC BLOOD PRESSURE: 84 MMHG

## 2018-12-01 RX ADMIN — Medication SCH TAB: at 10:02

## 2018-12-01 RX ADMIN — NICOTINE SCH: 14 PATCH, EXTENDED RELEASE TRANSDERMAL at 10:02

## 2018-12-01 NOTE — EKG
Test Reason : 

Blood Pressure : ***/*** mmHG

Vent. Rate : 074 BPM     Atrial Rate : 074 BPM

   P-R Int : 162 ms          QRS Dur : 098 ms

    QT Int : 380 ms       P-R-T Axes : 069 075 032 degrees

   QTc Int : 421 ms

 

NORMAL SINUS RHYTHM

NORMAL ECG

WHEN COMPARED WITH ECG OF 31-JUL-2018 00:56,

NO SIGNIFICANT CHANGE WAS FOUND

Confirmed by MD HUGO, WILI ([id number]) on 12/1/2018 5:33:05 PM

 

Referred By:             Confirmed By:WILI ARIAS MD

## 2018-12-01 NOTE — PN
[facility] Progress Note


Note: 





PT SAYS 'I'M FINE," AND WANTS TO LEAVE TODAY. PT SIGNED OUT AMA FOR PERSONAL 

REASONS STATING HE IS MOVING [facility]. ALERT O X 3.


 Laboratory Tests











  11/29/18 11/29/18 11/29/18





  07:00 07:00 07:00


 


WBC  8.2  


 


RBC  4.77  


 


Hgb  14.3  


 


Hct  40.9  


 


MCV  85.9  


 


MCH  30.1  


 


MCHC  35.1  


 


RDW  13.8  


 


Plt Count  178  D  


 


MPV  10.5  


 


Sodium   140 


 


Potassium   3.9 


 


Chloride   106 


 


Carbon Dioxide   25 


 


Anion Gap   9 


 


BUN   14 


 


Creatinine   0.8 


 


Creat Clearance w eGFR   > 60 


 


Random Glucose   77 


 


Calcium   8.9 


 


Total Bilirubin   0.4 


 


AST   15 


 


ALT   27 


 


Alkaline Phosphatase   84 


 


Total Protein   7.0 


 


Albumin   3.5 


 


Urine Color   


 


Urine Appearance   


 


Urine pH   


 


Ur Specific Gravity   


 


Urine Protein   


 


Urine Glucose (UA)   


 


Urine Ketones   


 


Urine Blood   


 


Urine Nitrite   


 


Urine Bilirubin   


 


Urine Urobilinogen   


 


Ur Leukocyte Esterase   


 


RPR Titer    Nonreactive


 


HIV 1&2 Antibody Screen   


 


HIV P24 Antigen   














  11/29/18 11/30/18





  13:00 08:00


 


WBC  


 


RBC  


 


Hgb  


 


Hct  


 


MCV  


 


MCH  


 


MCHC  


 


RDW  


 


Plt Count  


 


MPV  


 


Sodium  


 


Potassium  


 


Chloride  


 


Carbon Dioxide  


 


Anion Gap  


 


BUN  


 


Creatinine  


 


Creat Clearance w eGFR  


 


Random Glucose  


 


Calcium  


 


Total Bilirubin  


 


AST  


 


ALT  


 


Alkaline Phosphatase  


 


Total Protein  


 


Albumin  


 


Urine Color  Yellow 


 


Urine Appearance  Clear 


 


Urine pH  5.0 


 


Ur Specific Gravity  1.028 


 


Urine Protein  Negative 


 


Urine Glucose (UA)  Negative 


 


Urine Ketones  Negative 


 


Urine Blood  Negative 


 


Urine Nitrite  Negative 


 


Urine Bilirubin  Negative 


 


Urine Urobilinogen  Negative 


 


Ur Leukocyte Esterase  Negative 


 


RPR Titer  


 


HIV 1&2 Antibody Screen   Negative


 


HIV P24 Antigen   Negative








NAD





PLAN:PT SIGNED OUT AMA

## 2018-12-01 NOTE — DS
[facility] Detox Discharge Summary


Admission Date: 


11/29/18





Discharge Date: 12/01/18





- History


Present History: Alcohol Dependence, Cannabis Dependence, Cocaine Dependence, 

Opioid Dependence


Pertinent Past History: 





PLEASE SEE DX BELOW





- Physical Exam Results


Vital Signs: 


 Vital Signs











Temperature  97.5 F L  12/01/18 09:56


 


Pulse Rate  74   12/01/18 09:56


 


Respiratory Rate  18   12/01/18 09:56


 


Blood Pressure  122/84   12/01/18 09:56


 


O2 Sat by Pulse Oximetry (%)      











Pertinent Admission Physical Exam Findings: 





WITHDRAWAL SX


 Laboratory Tests











  11/29/18 11/29/18 11/29/18





  07:00 07:00 07:00


 


WBC  8.2  


 


RBC  4.77  


 


Hgb  14.3  


 


Hct  40.9  


 


MCV  85.9  


 


MCH  30.1  


 


MCHC  35.1  


 


RDW  13.8  


 


Plt Count  178  D  


 


MPV  10.5  


 


Sodium   140 


 


Potassium   3.9 


 


Chloride   106 


 


Carbon Dioxide   25 


 


Anion Gap   9 


 


BUN   14 


 


Creatinine   0.8 


 


Creat Clearance w eGFR   > 60 


 


Random Glucose   77 


 


Calcium   8.9 


 


Total Bilirubin   0.4 


 


AST   15 


 


ALT   27 


 


Alkaline Phosphatase   84 


 


Total Protein   7.0 


 


Albumin   3.5 


 


Urine Color   


 


Urine Appearance   


 


Urine pH   


 


Ur Specific Gravity   


 


Urine Protein   


 


Urine Glucose (UA)   


 


Urine Ketones   


 


Urine Blood   


 


Urine Nitrite   


 


Urine Bilirubin   


 


Urine Urobilinogen   


 


Ur Leukocyte Esterase   


 


RPR Titer    Nonreactive


 


HIV 1&2 Antibody Screen   


 


HIV P24 Antigen   














  11/29/18 11/30/18





  13:00 08:00


 


WBC  


 


RBC  


 


Hgb  


 


Hct  


 


MCV  


 


MCH  


 


MCHC  


 


RDW  


 


Plt Count  


 


MPV  


 


Sodium  


 


Potassium  


 


Chloride  


 


Carbon Dioxide  


 


Anion Gap  


 


BUN  


 


Creatinine  


 


Creat Clearance w eGFR  


 


Random Glucose  


 


Calcium  


 


Total Bilirubin  


 


AST  


 


ALT  


 


Alkaline Phosphatase  


 


Total Protein  


 


Albumin  


 


Urine Color  Yellow 


 


Urine Appearance  Clear 


 


Urine pH  5.0 


 


Ur Specific Gravity  1.028 


 


Urine Protein  Negative 


 


Urine Glucose (UA)  Negative 


 


Urine Ketones  Negative 


 


Urine Blood  Negative 


 


Urine Nitrite  Negative 


 


Urine Bilirubin  Negative 


 


Urine Urobilinogen  Negative 


 


Ur Leukocyte Esterase  Negative 


 


RPR Titer  


 


HIV 1&2 Antibody Screen   Negative


 


HIV P24 Antigen   Negative














- Treatment


Hospital Course: Discharged Condition Good





- Medication


Discharge Medications: 


Ambulatory Orders





NK [No Known Home Medication]  10/26/17 











- Diagnosis


(1) Alcohol dependence with uncomplicated withdrawal


Status: Acute   





(2) Cocaine dependence, uncomplicated


Status: Acute   





(3) Opioid dependence with withdrawal


Status: Acute   





(4) Cannabis dependence, uncomplicated


Status: Chronic   





(5) Nicotine dependence


Status: Chronic   


Qualifiers: 


   Nicotine product type: cigarettes   Substance use status: uncomplicated   

Qualified Code(s): F17.210 - Nicotine dependence, cigarettes, uncomplicated   





- AMA


Did Patient Leave Against Medical Advice: Yes

## 2019-02-14 ENCOUNTER — HOSPITAL ENCOUNTER (INPATIENT)
Dept: HOSPITAL 74 - YASAS | Age: 29
LOS: 5 days | Discharge: HOME | DRG: 773 | End: 2019-02-19
Attending: SURGERY | Admitting: SURGERY
Payer: COMMERCIAL

## 2019-02-14 VITALS — BODY MASS INDEX: 32.3 KG/M2

## 2019-02-14 DIAGNOSIS — F17.210: ICD-10-CM

## 2019-02-14 DIAGNOSIS — F10.230: ICD-10-CM

## 2019-02-14 DIAGNOSIS — F11.23: Primary | ICD-10-CM

## 2019-02-14 DIAGNOSIS — F12.20: ICD-10-CM

## 2019-02-14 PROCEDURE — HZ2ZZZZ DETOXIFICATION SERVICES FOR SUBSTANCE ABUSE TREATMENT: ICD-10-PCS | Performed by: SURGERY

## 2019-02-14 RX ADMIN — Medication SCH MG: at 22:01

## 2019-02-14 NOTE — HP
COWS





- Scale


Resting Pulse: 1= DE 


Sweatin= Chills/Flushing


Restless Observation: 0= Sits Still


Pupil Size: 0= Normal to Room Light


Bone or Joint Aches: 4=Acute Joint/Muscle Pain


Runny Nose/ Eye Tearin= Runny Nose/Eyes


GI Upset > 30mins: 1= Stomach Cramp


Tremor Observation: 0= None


Yawning Observation: 1= 1-2x During Session


Anxiety or Irritability: 2=Irritable/Anxious


Goose Flesh Skin: 0=Smooth Skin


COWS Score: 12





CIWA Score


Nausea/Vomitin-Mild Nausea/No Vomiting


Muscle Tremors: None


Anxiety: 1-Mildly Anxious


Agitation: 1-Slight > Activity


Paroxysmal Sweats: 2


Orientation: 0-Oriented


Tacttile Disturbances: 0-None


Auditory Disturbances: 0-None


Visual Disturbances: 0-None


Headache: 2-Mild


CIWA-Ar Total Score: 7





- Admission Criteria


OASAS Guidelines: Admission for Medically Managed Detox: 


Requires at least one of the followin. CIWA greater than 12


2. Seizures within the past 24 hours


3. Delirium tremens within the past 24 hours


4. Hallucinations within the past 24 hours


5. Acute intervention needed for co  occurring medical disorder


6. Acute intervention needed for co  occurring psychiatric disorder


7. Severe withdrawal that cannot be handled at a lower level of care (continued


    vomiting, continued diarrhea, abnormal vital signs) requiring intravenous


    medication and/or fluids


8. Pregnancy








Admission ROS [facility]


Allergies/Adverse Reactions: 


 Allergies











Allergy/AdvReac Type Severity Reaction Status Date / Time


 


No Known Allergies Allergy   Verified 18 02:53











History of Present Illness: 





patient  here requesting detox from heroin use , reports use since age  26  , 

current daily use 10-15 bags via inhalation , denies IVDU  , latest use this 

morning,current symptoms as above  . Denies MMTP , denies OD , prior detox 

2018 at this facility , did not go to rehab . 


cocaine use : denies 


cannabis : occasional use  


tobacco : 1  ppd since age 18 


etoh : 1  pint , latest use 2  days  ago , denies tremors , seizures  ,first 

age of use 18  , denies symptoms , blackouts or seizures . 


fentanyl : denies use 


PMHX : denies 


PSHX : denies  


PSych : denies 


SHX : lives w/ mother  , unemployed , finances  habit  through shoplifting , 

denies  legal issues , 3  children ages  12,6  & 2  months  w/ 3  bio mothers  

in [facility]  .  


Exam Limitations: No Limitations





- Ebola screening


Have you traveled outside of the country in the last 21 days: No


Have you had contact with anyone from an Ebola affected area: No


Have you been sick,other than usual withdrawal symptoms: No


Do you have a fever: No





- Review of Systems


Constitutional: See HPI


EENT: reports: See HPI


Respiratory: reports: No Symptoms reported


Cardiac: reports: No Symptoms Reported


GI: reports: See HPI


: reports: No Symptoms Reported


Musculoskeletal: reports: Back Pain, Muscle Pain


Integumentary: reports: No Symptoms Reported


Neuro: reports: Headache


Endocrine: reports: No Symptoms Reported


Psychiatric: reports: Orientated x3, Agitated, Anxious





Patient History





- Patient Medical History


Hx Anemia: No


Hx Asthma: No


Hx Chronic Obstructive Pulmonary Disease (COPD): No


Hx Cancer: No


Hx Cardiac Disorders: No


Hx Congestive Heart Failure: No


Hx Hypertension: No


Hx Hypercholesterolemia: No


Hx Pacemaker: No


HX Cerebrovascular Accident: No


Hx Seizures: No


Hx Dementia: No


Hx Diabetes: No


Hx Gastrointestinal Disorders: No


Hx Liver Disease: No


Hx Genitourinary Disorders: No


Hx Sexually Transmitted Disorders: No


Hx Renal Disease (ESRD): No


Hx Thyroid Disease: No


Hx Human Immunodeficiency Virus (HIV): No (Last tested approx. 2 months ago: 

NEGATIVE.)


Hx Hepatitis C: No (Last tested approx. 2 months ago: NEGATIVE.)


Hx Depression: No


Hx Suicide Attempt: No (PATIENT DENIES CURRENT SI / HI.)


Hx Bipolar Disorder: No


Hx Schizophrenia: No





- Patient Surgical History


Past Surgical History: No


Hx Neurologic Surgery: No


Hx Cataract Extraction: No


Hx Cardiac Surgery: No


Hx Lung Surgery: No


Hx Breast Surgery: No


Hx Breast Biopsy: No


Hx Abdominal Surgery: No


Hx Appendectomy: No


Hx Cholecystectomy: No


Hx Genitourinary Surgery: No


Hx Orthopedic Surgery: No


Anesthesia Reaction: No





- PPD History


Date: 18





- Smoking Cessation


Smoking history: Current every day smoker


Have you smoked in the past 12 months: Yes


Aproximately how many cigarettes per day: 20


Cigars Per Day: 0


Hx Chewing Tobacco Use: No


Initiated information on smoking cessation: No





Family Disease History





- Family Disease History


Family Disease History: Diabetes: Grandparent, Sister





Admission Physical Exam [facility]





- Vital Signs


Vital Signs: 


 Vital Signs - 24 hr











  19





  15:44


 


Temperature 98.5 F


 


Pulse Rate 87


 


Respiratory 20





Rate 


 


Blood Pressure 113/77














- Physical


General Appearance: Yes: Mild Distress, Anxious


HEENTM: Yes: EOMI, Hearing grossly Normal, Normocephalic, Normal Voice, Nasal 

Congestion, Rhinorrhea


Respiratory: Yes: Chest Non-Tender, Lungs Clear, Normal Breath Sounds


Neck: Yes: No masses,lesions,Nodules, Trachea in good position


Cardiology: Yes: Regular Rhythm, Regular Rate, S1, S2


Abdominal: Yes: Normal Bowel Sounds, Non Tender, Soft


Back: Yes: Normal Inspection


Musculoskeletal: Yes: full range of Motion, Gait Steady


Extremities: Yes: Normal Capillary Refill, Non-Tender


Neurological: Yes: Motor Strength 5/5


Integumentary: Yes: Normal Color





- Diagnostic


(1) Opioid dependence with withdrawal


Current Visit: Yes   Status: Acute   





(2) Cannabis dependence, uncomplicated


Current Visit: Yes   Status: Chronic   





(3) Nicotine dependence


Current Visit: Yes   Status: Chronic   


Qualifiers: 


   Nicotine product type: cigarettes   Substance use status: uncomplicated   

Qualified Code(s): F17.210 - Nicotine dependence, cigarettes, uncomplicated   





(4) Alcohol dependence with uncomplicated withdrawal


Current Visit: Yes   Status: Acute   





BHS Breath Alcohol Content


Breath Alcohol Content: 0





Urine Drug Screen





- Results


Drug Screen Negative: No


Urine Drug Screen Results: THC-Marijuana, THIERNO-Cocaine, OPI-Opiates, FEN-Fentanyl





Inpatient Rehab Admission





- Rehab Decision to Admit


Inpatient rehab admission?: No

## 2019-02-15 LAB
ALBUMIN SERPL-MCNC: 3.4 G/DL (ref 3.4–5)
ALP SERPL-CCNC: 74 U/L (ref 45–117)
ALT SERPL-CCNC: 23 U/L (ref 13–61)
ANION GAP SERPL CALC-SCNC: 6 MMOL/L (ref 8–16)
AST SERPL-CCNC: 14 U/L (ref 15–37)
BILIRUB SERPL-MCNC: 0.2 MG/DL (ref 0.2–1)
BUN SERPL-MCNC: 7 MG/DL (ref 7–18)
CALCIUM SERPL-MCNC: 8.8 MG/DL (ref 8.5–10.1)
CHLORIDE SERPL-SCNC: 106 MMOL/L (ref 98–107)
CO2 SERPL-SCNC: 30 MMOL/L (ref 21–32)
CREAT SERPL-MCNC: 0.7 MG/DL (ref 0.55–1.3)
DEPRECATED RDW RBC AUTO: 13.4 % (ref 11.9–15.9)
GLUCOSE SERPL-MCNC: 77 MG/DL (ref 74–106)
HCT VFR BLD CALC: 42 % (ref 35.4–49)
HGB BLD-MCNC: 14.3 GM/DL (ref 11.7–16.9)
MCH RBC QN AUTO: 30.2 PG (ref 25.7–33.7)
MCHC RBC AUTO-ENTMCNC: 34.1 G/DL (ref 32–35.9)
MCV RBC: 88.4 FL (ref 80–96)
PLATELET # BLD AUTO: 138 K/MM3 (ref 134–434)
PMV BLD: 10.7 FL (ref 7.5–11.1)
POTASSIUM SERPLBLD-SCNC: 3.8 MMOL/L (ref 3.5–5.1)
PROT SERPL-MCNC: 6.5 G/DL (ref 6.4–8.2)
RBC # BLD AUTO: 4.75 M/MM3 (ref 4–5.6)
SODIUM SERPL-SCNC: 142 MMOL/L (ref 136–145)
WBC # BLD AUTO: 7.5 K/MM3 (ref 4–10)

## 2019-02-15 RX ADMIN — Medication SCH MG: at 22:23

## 2019-02-15 RX ADMIN — Medication SCH TAB: at 10:12

## 2019-02-15 RX ADMIN — CYCLOBENZAPRINE HYDROCHLORIDE PRN MG: 10 TABLET, FILM COATED ORAL at 10:16

## 2019-02-15 RX ADMIN — Medication PRN MG: at 22:23

## 2019-02-15 RX ADMIN — CYCLOBENZAPRINE HYDROCHLORIDE PRN MG: 10 TABLET, FILM COATED ORAL at 22:22

## 2019-02-15 NOTE — PN
S CIWA





- CIWA Score


Nausea/Vomitin


Muscle Tremors: 2


Anxiety: 2


Agitation: 2


Paroxysmal Sweats: 1-Minimal Palms Moist


Orientation: 0-Oriented


Tacttile Disturbances: 1-Very Mild Itch/Numbness


Auditory Disturbances: 1-Very Mild


Visual Disturbances: 0-None


Headache: 2-Mild


CIWA-Ar Total Score: 13





BHS COWS





- Scale


Resting Pulse: 0= OR 80 or Below


Sweatin= Chills/Flushing


Restless Observation: 3= Extraneous Movement


Pupil Size: 1= Pupils >than Normal


Bone or Joint Aches: 2= Severe Diffuse Aches


Runny Nose/ Eye Tearin= Nasal Congestion


GI Upset > 30mins: 2= Nausea/Diarrhea


Tremor Observation of Outstretched Hands: 2= Slight Tremor Visible


Yawning Observation: 1= 1-2x During Session


Anxiety or Irritability: 2=Irritable/Anxious


Goose Flesh Skin: 0=Smooth Skin


COWS Score: 15





BHS Progress Note (SOAP)


Subjective: 





alert,irritable,anxious,interrupted sleep,tremor,pain in the body and back


Objective: 





02/15/19 09:36


 Vital Signs











Temperature  97.8 F   02/15/19 09:13


 


Pulse Rate  69   02/15/19 09:13


 


Respiratory Rate  18   02/15/19 09:13


 


Blood Pressure  102/59 L  02/15/19 09:13


 


O2 Sat by Pulse Oximetry (%)      











02/15/19 09:36


labs pending


Assessment: 





02/15/19 09:37


withdrawal symptom


Plan: 





continue detox

## 2019-02-16 RX ADMIN — CYCLOBENZAPRINE HYDROCHLORIDE PRN MG: 10 TABLET, FILM COATED ORAL at 14:36

## 2019-02-16 RX ADMIN — Medication SCH TAB: at 10:18

## 2019-02-16 RX ADMIN — CYCLOBENZAPRINE HYDROCHLORIDE PRN MG: 10 TABLET, FILM COATED ORAL at 22:55

## 2019-02-16 RX ADMIN — Medication SCH MG: at 22:53

## 2019-02-16 NOTE — PN
S CIWA





- CIWA Score


Nausea/Vomitin


Muscle Tremors: 2


Anxiety: 2


Agitation: 2


Paroxysmal Sweats: 1-Minimal Palms Moist


Orientation: 0-Oriented


Tacttile Disturbances: 1-Very Mild Itch/Numbness


Auditory Disturbances: 1-Very Mild


Visual Disturbances: 0-None


Headache: 2-Mild


CIWA-Ar Total Score: 13





BHS COWS





- Scale


Resting Pulse: 0= [place] 80 or Below


Sweatin= Chills/Flushing


Restless Observation: 1= Difficult to Sit Still


Pupil Size: 1= Pupils >than Normal


Bone or Joint Aches: 2= Severe Diffuse Aches


Runny Nose/ Eye Tearin= Runny Nose/Eyes


GI Upset > 30mins: 2= Nausea/Diarrhea


Tremor Observation of Outstretched Hands: 2= Slight Tremor Visible


Yawning Observation: 1= 1-2x During Session


Anxiety or Irritability: 2=Irritable/Anxious


Goose Flesh Skin: 0=Smooth Skin


COWS Score: 14





BHS Progress Note (SOAP)


Subjective: 





alert,irritable,anxious,interrupted sleep,tremor,pain in the body and back


Objective: 





19 09:39


 Vital Signs











Temperature  98.2 F   19 09:35


 


Pulse Rate  77   19 09:35


 


Respiratory Rate  16   19 09:35


 


Blood Pressure  103/61   19 09:35


 


O2 Sat by Pulse Oximetry (%)      











19 09:39


 Laboratory Last Values











WBC  7.5 K/mm3 (4.0-10.0)   02/15/19  07:00    


 


RBC  4.75 M/mm3 (4.00-5.60)   02/15/19  07:00    


 


Hgb  14.3 GM/dL (11.7-16.9)   02/15/19  07:00    


 


Hct  42.0 % (35.4-49)   02/15/19  07:00    


 


MCV  88.4 fl (80-96)   02/15/19  07:00    


 


MCH  30.2 pg (25.7-33.7)   02/15/19  07:00    


 


MCHC  34.1 g/dl (32.0-35.9)   02/15/19  07:00    


 


RDW  13.4 % (11.9-15.9)   02/15/19  07:00    


 


Plt Count  138 K/MM3 (134-434)  D 02/15/19  07:00    


 


MPV  10.7 fl (7.5-11.1)   02/15/19  07:00    


 


Sodium  142 mmol/L (136-145)   02/15/19  07:00    


 


Potassium  3.8 mmol/L (3.5-5.1)   02/15/19  07:00    


 


Chloride  106 mmol/L ()   02/15/19  07:00    


 


Carbon Dioxide  30 mmol/L (21-32)   02/15/19  07:00    


 


Anion Gap  6 MMOL/L (8-16)  L  02/15/19  07:00    


 


BUN  7 mg/dL (7-18)   02/15/19  07:00    


 


Creatinine  0.7 mg/dL (0.55-1.3)   02/15/19  07:00    


 


Creat Clearance w eGFR  > 60  (>60)   02/15/19  07:00    


 


Random Glucose  77 mg/dL ()   02/15/19  07:00    


 


Calcium  8.8 mg/dL (8.5-10.1)   02/15/19  07:00    


 


Total Bilirubin  0.2 mg/dL (0.2-1)   02/15/19  07:00    


 


AST  14 U/L (15-37)  L  02/15/19  07:00    


 


ALT  23 U/L (13-61)   02/15/19  07:00    


 


Alkaline Phosphatase  74 U/L ()   02/15/19  07:00    


 


Total Protein  6.5 g/dl (6.4-8.2)   02/15/19  07:00    


 


Albumin  3.4 g/dl (3.4-5.0)   02/15/19  07:00    


 


RPR Titer  Nonreactive  (NONREACTIVE)   02/15/19  07:00    











Assessment: 





19 09:39


withdrawal symptom


Plan: 





continue detox

## 2019-02-17 RX ADMIN — Medication PRN MG: at 22:09

## 2019-02-17 RX ADMIN — Medication SCH TAB: at 10:17

## 2019-02-17 RX ADMIN — CYCLOBENZAPRINE HYDROCHLORIDE PRN MG: 10 TABLET, FILM COATED ORAL at 10:17

## 2019-02-17 RX ADMIN — CYCLOBENZAPRINE HYDROCHLORIDE PRN MG: 10 TABLET, FILM COATED ORAL at 23:15

## 2019-02-17 RX ADMIN — Medication SCH MG: at 22:08

## 2019-02-17 NOTE — PN
BHS Progress Note


Note: 





PATIENT CONTINUES DETOX FROM HEROIN DEPENDENCE. C/O NAUSEA, POOR APPETITE, 

CHILLS AND SLEEP DISTURBANCE.


 Laboratory Tests











  02/15/19 02/15/19 02/15/19





  07:00 07:00 07:00


 


WBC  7.5  


 


RBC  4.75  


 


Hgb  14.3  


 


Hct  42.0  


 


MCV  88.4  


 


MCH  30.2  


 


MCHC  34.1  


 


RDW  13.4  


 


Plt Count  138  D  


 


MPV  10.7  


 


Sodium   142 


 


Potassium   3.8 


 


Chloride   106 


 


Carbon Dioxide   30 


 


Anion Gap   6 L 


 


BUN   7 


 


Creatinine   0.7 


 


Creat Clearance w eGFR   > 60 


 


Random Glucose   77 


 


Calcium   8.8 


 


Total Bilirubin   0.2 


 


AST   14 L 


 


ALT   23 


 


Alkaline Phosphatase   74 


 


Total Protein   6.5 


 


Albumin   3.4 


 


RPR Titer    Nonreactive








 Vital Signs











Temperature  98.2 F   02/17/19 09:26


 


Pulse Rate  81   02/17/19 09:26


 


Respiratory Rate  18   02/17/19 09:26


 


Blood Pressure  125/80   02/17/19 09:26


 


O2 Sat by Pulse Oximetry (%)      








PE:





ALERT AND ORIENTED X 3


SKIN WARM AND MOIST ON FOREHEAD AND TRUNK AREA


EXT FULL ROM, NO TREMORS


AMB AD FRANC


ANXIOUS








A/P:





WITHDRAWAL SX








CONTINUE DETOX


ENCOURAGE ORAL FLUIDS


ENSURE AS SUPPLEMENT


CONTINUE TO MONITOR CLINICALLY

## 2019-02-18 RX ADMIN — Medication SCH TAB: at 10:53

## 2019-02-18 RX ADMIN — Medication SCH: at 22:40

## 2019-02-18 RX ADMIN — Medication PRN MG: at 23:35

## 2019-02-18 RX ADMIN — CYCLOBENZAPRINE HYDROCHLORIDE PRN MG: 10 TABLET, FILM COATED ORAL at 13:50

## 2019-02-18 RX ADMIN — CYCLOBENZAPRINE HYDROCHLORIDE PRN MG: 10 TABLET, FILM COATED ORAL at 23:35

## 2019-02-18 NOTE — PN
BHS Progress Note


Note: 





DURING AM ROUNDS ASSESSMENT EARLIER TODAY, PATIENT REPORTED THAT HE WAS FEELING 

WELL AND THAT HE WAS CONTEMPLATING LEAVING ONE DAY EARLIER (ORIGINAL DISCHARGE 

DATE SCHEDULED FOR TOMORROW, 02/19/2019). HOWEVER, PATIENT THEN DECIDED TOP 

REMAIN ON DETOX UNIT UNTIL ORIGINAL DISCHARGE DATE OF TOMORROW. DURING 

AFTERNOON NP SPOKE TO PATIENT AND HE REPORTED THAT HE WAS NOT FEELING WELL AND 

THAT HE WAS CONTEMPLATING LEAVING DETOX UNIT BECAUSE HIS PRN VALIUM HAD SINCE 

COMPLETED. PATIENT MADE AWARE THAT HE IS PRESCRIBED SUPPORTIVE MEDICATIONS OF 

FLEXERIL PO PRN AND CLONIDINE PO BID. HOWEVER, PATIENT RESPONDED THAT HE WAS 

STILL CONTEMPLATING LEAVING DETOX UNIT. PATIENT MADE AWARE THAT, GIVEN NEW 

CIRCUMSTANCES AND GIVEN HIS REPORT OF "NOT FEELING WELL," IF HE CHOOSES TO 

LEAVE DETOX UNIT BEFORE TOMORROW AM, HE WILL BE DOING SO AGAINST MEDICAL 

ADVICE. PATIENT VERBALIZED UNDERSTANDING OF RECOMMENDATION. PATIENT STILL 

UNDECIDED ABOUT LEAVING DETOX UNIT AT THIS TIME. HUGO MEDINA NP

## 2019-02-18 NOTE — EKG
Test Reason : 

Blood Pressure : ***/*** mmHG

Vent. Rate : 090 BPM     Atrial Rate : 090 BPM

   P-R Int : 152 ms          QRS Dur : 088 ms

    QT Int : 346 ms       P-R-T Axes : 065 064 030 degrees

   QTc Int : 423 ms

 

NORMAL SINUS RHYTHM

NORMAL ECG

WHEN COMPARED WITH ECG OF 29-NOV-2018 02:49,

T WAVE VARIATION

Confirmed by KIRK GARNETT MD ([id number]) on 2/18/2019 10:39:28 AM

 

Referred By:             Confirmed By:KIRK GARNETT MD

## 2019-02-18 NOTE — PN
BHS Progress Note (SOAP)


Subjective: 





Fatigue.


Objective: 


PATIENT A & O X 3, OBSERVED AMBULATING ON UNIT. IN NO ACUTE DISTRESS.





02/18/19 12:54


 Vital Signs











Temperature  97.7 F   02/18/19 09:34


 


Pulse Rate  73   02/18/19 09:34


 


Respiratory Rate  18   02/18/19 09:34


 


Blood Pressure  103/68   02/18/19 09:34


 


O2 Sat by Pulse Oximetry (%)      








 Laboratory Tests











  02/15/19 02/15/19 02/15/19





  07:00 07:00 07:00


 


WBC  7.5  


 


RBC  4.75  


 


Hgb  14.3  


 


Hct  42.0  


 


MCV  88.4  


 


MCH  30.2  


 


MCHC  34.1  


 


RDW  13.4  


 


Plt Count  138  D  


 


MPV  10.7  


 


Sodium   142 


 


Potassium   3.8 


 


Chloride   106 


 


Carbon Dioxide   30 


 


Anion Gap   6 L 


 


BUN   7 


 


Creatinine   0.7 


 


Creat Clearance w eGFR   > 60 


 


Random Glucose   77 


 


Calcium   8.8 


 


Total Bilirubin   0.2 


 


AST   14 L 


 


ALT   23 


 


Alkaline Phosphatase   74 


 


Total Protein   6.5 


 


Albumin   3.4 


 


RPR Titer    Nonreactive








LABS NOTED.


Assessment: 





02/18/19 12:54


WITHDRAWAL SYMPTOMS.


Plan: 





CONTINUE DETOX.





PATIENT SCHEDULED FOR DETOX TOMORROW.

## 2019-02-19 VITALS — DIASTOLIC BLOOD PRESSURE: 84 MMHG | SYSTOLIC BLOOD PRESSURE: 143 MMHG | TEMPERATURE: 98.2 F | HEART RATE: 96 BPM

## 2019-02-19 NOTE — DS
BHS Detox Discharge Summary


Admission Date: 


02/14/19





Discharge Date: 02/19/19





- History


Present History: Alcohol Dependence, Cannabis Dependence, Opioid Dependence


Additional Comments: 





PATIENT GOING HOME. PATIENT ADVISED TO CONSIDER LOCAL 12-STEP / NA / AA 

OUTPATIENT SUPPORT GROUP PROGRAM FOR AFTERCARE. PATIENT VERBALIZED 

UNDERSTANDING OF RECOMMENDATION. PATIENT WAS DISCHARGED FROM DETOX UNIT IN 

STABLE MEDICAL CONDITION.


Pertinent Past History: 





Nicotine Dependence.





- Physical Exam Results


Vital Signs: 


 Vital Signs











Temperature  98.2 F   02/19/19 09:38


 


Pulse Rate  96 H  02/19/19 09:38


 


Respiratory Rate  18   02/19/19 09:38


 


Blood Pressure  143/84   02/19/19 09:38


 


O2 Sat by Pulse Oximetry (%)      











Pertinent Admission Physical Exam Findings: 





WITHDRAWAL SYMPTOMS.





 Laboratory Tests











  02/15/19 02/15/19 02/15/19





  07:00 07:00 07:00


 


WBC  7.5  


 


RBC  4.75  


 


Hgb  14.3  


 


Hct  42.0  


 


MCV  88.4  


 


MCH  30.2  


 


MCHC  34.1  


 


RDW  13.4  


 


Plt Count  138  D  


 


MPV  10.7  


 


Sodium   142 


 


Potassium   3.8 


 


Chloride   106 


 


Carbon Dioxide   30 


 


Anion Gap   6 L 


 


BUN   7 


 


Creatinine   0.7 


 


Creat Clearance w eGFR   > 60 


 


Random Glucose   77 


 


Calcium   8.8 


 


Total Bilirubin   0.2 


 


AST   14 L 


 


ALT   23 


 


Alkaline Phosphatase   74 


 


Total Protein   6.5 


 


Albumin   3.4 


 


RPR Titer    Nonreactive








LABS NOTED.





- Treatment


Hospital Course: Detox Protocol Followed, Detoxed Safely, Responded well, 

Discharged Condition Good


Patient has Accepted a Rehab Referral to: PATIENT ADVISED TO CONSIDER LOCAL 12-

STEP/NA/AA OUTPATIENT SUPPORT GROUP.





- Medication


Discharge Medications: 


Ambulatory Orders





NK [No Known Home Medication]  10/26/17 











- Diagnosis


(1) Alcohol dependence with uncomplicated withdrawal


Status: Acute   





(2) Opioid dependence with withdrawal


Status: Acute   





(3) Nicotine dependence


Status: Chronic   


Qualifiers: 


   Nicotine product type: cigarettes   Substance use status: uncomplicated   

Qualified Code(s): F17.210 - Nicotine dependence, cigarettes, uncomplicated   





(4) Cannabis dependence, uncomplicated


Status: Chronic   





- AMA


Did Patient Leave Against Medical Advice: No

## 2019-03-06 ENCOUNTER — HOSPITAL ENCOUNTER (INPATIENT)
Dept: HOSPITAL 74 - YASAS | Age: 29
LOS: 3 days | Discharge: HOME | DRG: 773 | End: 2019-03-09
Attending: SURGERY | Admitting: SURGERY
Payer: COMMERCIAL

## 2019-03-06 VITALS — BODY MASS INDEX: 26.6 KG/M2

## 2019-03-06 DIAGNOSIS — F10.230: ICD-10-CM

## 2019-03-06 DIAGNOSIS — F11.23: Primary | ICD-10-CM

## 2019-03-06 DIAGNOSIS — F17.210: ICD-10-CM

## 2019-03-06 DIAGNOSIS — L02.91: ICD-10-CM

## 2019-03-06 DIAGNOSIS — E87.6: ICD-10-CM

## 2019-03-06 LAB
APPEARANCE UR: CLEAR
BILIRUB UR STRIP.AUTO-MCNC: NEGATIVE MG/DL
COLOR UR: YELLOW
KETONES UR QL STRIP: (no result)
LEUKOCYTE ESTERASE UR QL STRIP.AUTO: NEGATIVE
MUCOUS THREADS URNS QL MICRO: (no result)
NITRITE UR QL STRIP: NEGATIVE
PH UR: 6 [PH] (ref 5–8)
PROT UR QL STRIP: (no result)
PROT UR QL STRIP: NEGATIVE
SP GR UR: 1.03 (ref 1.01–1.03)
UROBILINOGEN UR STRIP-MCNC: 2 MG/DL (ref 0.2–1)

## 2019-03-06 PROCEDURE — HZ2ZZZZ DETOXIFICATION SERVICES FOR SUBSTANCE ABUSE TREATMENT: ICD-10-PCS | Performed by: SURGERY

## 2019-03-06 RX ADMIN — BACITRACIN ZINC SCH APPLIC: 500 OINTMENT TOPICAL at 22:44

## 2019-03-06 RX ADMIN — Medication PRN MG: at 22:38

## 2019-03-06 RX ADMIN — CEPHALEXIN SCH MG: 250 CAPSULE ORAL at 23:14

## 2019-03-06 NOTE — HP
COWS





- Scale


Resting Pulse: 4= [place] > 121


Sweatin= Chills/Flushing


Restless Observation: 1= Difficult to Sit Still


Pupil Size: 1= Pupils >than Normal


Bone or Joint Aches: 1= Mild Discomfort


Runny Nose/ Eye Tearin= Runny Nose/Eyes


GI Upset > 30mins: 3= Vomiting/Diarrhea


Tremor Observation: 0= None


Yawning Observation: 2= >3x During Session


Anxiety or Irritability: 2=Irritable/Anxious


Goose Flesh Skin: 0=Smooth Skin


COWS Score: 17





CIWA Score


Nausea/Vomitin-Int. Nausea w/Dry Heave


Muscle Tremors: 2


Anxiety: 3


Agitation: 1-Slight > Activity


Paroxysmal Sweats: 2


Orientation: 1-Uncertain about Date


Tacttile Disturbances: 1-Very Mild Itch/Numbness


Auditory Disturbances: 0-None


Visual Disturbances: 0-None


Headache: 2-Mild


CIWA-Ar Total Score: 16





- Admission Criteria


OAS Guidelines: Admission for Medically Managed Detox: 


Requires at least one of the followin. CIWA greater than 12


2. Seizures within the past 24 hours


3. Delirium tremens within the past 24 hours


4. Hallucinations within the past 24 hours


5. Acute intervention needed for co  occurring medical disorder


6. Acute intervention needed for co  occurring psychiatric disorder


7. Severe withdrawal that cannot be handled at a lower level of care (continued


    vomiting, continued diarrhea, abnormal vital signs) requiring intravenous


    medication and/or fluids


8. Pregnancy





Patient presents the following: CIWA greater than 12


Admission Criteria Met: Admission criteria met





Admission ROS [facility]


Chief Complaint: 





" I need detox"


Allergies/Adverse Reactions: 


 Allergies











Allergy/AdvReac Type Severity Reaction Status Date / Time


 


No Known Allergies Allergy   Verified 19 21:10











History of Present Illness: 





29 yo male with nicotine, alcohol, heroin ( nasal) dependence is here seeking 

detox d/t withdrawal symptoms, this is one of multiple admissions. Last detox 

[facility] 19 - , patient reports he relapse soon after, plans to attend 

rehabilitation after this detox. Reports no significant period of sobriety. 

Denies hx of seizures, or overdose. Denies medial hx or psych.


Exam Limitations: No Limitations





- Ebola screening


Have you traveled outside of the country in the last 21 days: No


Have you had contact with anyone from an Ebola affected area: No


Have you been sick,other than usual withdrawal symptoms: No


Do you have a fever: No





- Review of Systems


Constitutional: Chills, Loss of Appetite, Changes in sleep


EENT: reports: Nose Congestion (runny nose)


Respiratory: reports: No Symptoms reported


Cardiac: reports: Palpitations


GI: reports: Constipated (last bm yesterday), Nausea, Poor Appetite, Poor Fluid 

Intake, Vomiting, Abdominal cramping


: reports: No Symptoms Reported


Musculoskeletal: reports: Back Pain


Integumentary: reports: Dryness, Lesions (abcess), Rash (right brow)


Neuro: reports: Dizziness


Endocrine: reports: Increased Thirst


Hematology: reports: No Symptoms Reported


Psychiatric: reports: Orientated x3, Anxious


Other Systems: Reviewed and Negative





Patient History





- Patient Medical History


Hx Anemia: No


Hx Asthma: No


Hx Chronic Obstructive Pulmonary Disease (COPD): No


Hx Cancer: No


Hx Cardiac Disorders: No


Hx Congestive Heart Failure: No


Hx Hypertension: No


Hx Hypercholesterolemia: No


Hx Pacemaker: No


HX Cerebrovascular Accident: No


Hx Seizures: No


Hx Dementia: No


Hx Diabetes: No


Hx Gastrointestinal Disorders: No


Hx Liver Disease: No


Hx Genitourinary Disorders: No


Hx Sexually Transmitted Disorders: No


Hx Renal Disease (ESRD): No


Hx Thyroid Disease: No


Hx Human Immunodeficiency Virus (HIV): No (Last tested approx. 2 months ago: 

NEGATIVE.)


Hx Hepatitis C: No (Last tested approx. 2 months ago: NEGATIVE.)


Hx Depression: No


Hx Suicide Attempt: No (PATIENT DENIES CURRENT SI / HI.)


Hx Bipolar Disorder: No


Hx Schizophrenia: No





- Patient Surgical History


Past Surgical History: No


Hx Neurologic Surgery: No


Hx Cataract Extraction: No


Hx Cardiac Surgery: No


Hx Lung Surgery: No


Hx Breast Surgery: No


Hx Breast Biopsy: No


Hx Abdominal Surgery: No


Hx Appendectomy: No


Hx Cholecystectomy: No


Hx Genitourinary Surgery: No


Hx Orthopedic Surgery: No


Anesthesia Reaction: No





- PPD History


Previous Implant?: No


Documented Results: Negative w/proof


Date: 18


PPD to be Administered?: No





- Smoking Cessation


Smoking history: Current every day smoker


Have you smoked in the past 12 months: Yes


Aproximately how many cigarettes per day: 20


Cigars Per Day: 0


Hx Chewing Tobacco Use: No


Initiated information on smoking cessation: Yes


'Breaking Loose' booklet given: 19





- Substance & Tx. History


Hx Alcohol Use: No


Hx Substance Use: Yes


Substance Use Type: Heroin


Hx Substance Use Treatment: Yes





- Substances Abused


  ** alcohol


Route: Oral


Frequency: Daily


Amount used: 1 pint liquor 


Age of first use: 18


Date of Last Use: 19





  ** Heroin


Route: Inhalation


Frequency: Daily


Amount used: 10 


Age of first use: 26


Date of Last Use: 19





Family Disease History





- Family Disease History


Family Disease History: Diabetes: Grandparent, Sister





Admission Physical Exam [facility]





- Vital Signs


Vital Signs: 


 Vital Signs - 24 hr











  19





  16:14


 


Temperature 96.2 F L


 


Pulse Rate 122 H


 


Respiratory 20





Rate 


 


Blood Pressure 127/89














- Physical


General Appearance: Yes: Disheveled, Moderate Distress, Thin, Sweating, Anxious


HEENTM: Yes: EOMI, Hearing grossly Normal, Normal ENT Inspection, Normocephalic

, Normal Voice, PEREZ, Pharynx Normal, Tm's normal


Respiratory: Yes: Chest Non-Tender, Lungs Clear, Normal Breath Sounds, No 

Respiratory Distress, No Accessory Muscle Use


Neck: Yes: Within Normal Limits


Breast: Yes: Breast Exam Deferred


Cardiology: Yes: Regular Rhythm, Tachycardia


Abdominal: Yes: Normal Bowel Sounds, Non Tender, Flat, Soft


Genitourinary: Yes: Within Normal Limits


Back: Yes: Normal Inspection


Musculoskeletal: Yes: full range of Motion, Gait Steady, Pelvis Stable, Back 

pain


Extremities: Yes: Normal Capillary Refill, Normal Inspection, Normal Range of 

Motion, Non-Tender


Neurological: Yes: CNs II-XII NML intact, Fully Oriented, Alert, Motor Strength 

5/5, Depressed Affect


Integumentary: Yes: Normal Color, Warm, Diaphoresis, Other (erythematus rash on 

right brow, lesion back of head)


Lymphatic: Yes: Within Normal Limits





- Diagnostic


(1) Abscess


Current Visit: Yes   Status: Acute   





(2) Alcohol dependence with uncomplicated withdrawal


Current Visit: Yes   Status: Acute   





(3) Opioid dependence with withdrawal


Current Visit: Yes   Status: Acute   





(4) Nicotine dependence


Current Visit: Yes   Status: Chronic   


Qualifiers: 


   Nicotine product type: cigarettes   Substance use status: uncomplicated   

Qualified Code(s): F17.210 - Nicotine dependence, cigarettes, uncomplicated   





Cleared for Admission [facility]





- Detox or Rehab


[facility] Level of Care: Medically Managed


Detox Regimen/Protocol: Methadone/Valium





[facility] Breath Alcohol Content


Breath Alcohol Content: 0





Urine Drug Screen





- Results


Drug Screen Negative: No


Urine Drug Screen Results: THC-Marijuana, OPI-Opiates, BZO-Benzodiazepines, FEN-

Fentanyl





Inpatient Rehab Admission





- Rehab Decision to Admit


Inpatient rehab admission?: No

## 2019-03-06 NOTE — PN
[facility] Progress Note


Note: 





Informed by staff, patient reported he asked his mother to pick him up and will 

return in the morning.

## 2019-03-07 LAB
ALBUMIN SERPL-MCNC: 3.8 G/DL (ref 3.4–5)
ALP SERPL-CCNC: 85 U/L (ref 45–117)
ALT SERPL-CCNC: 41 U/L (ref 13–61)
ANION GAP SERPL CALC-SCNC: 11 MMOL/L (ref 8–16)
AST SERPL-CCNC: 28 U/L (ref 15–37)
BILIRUB SERPL-MCNC: 0.7 MG/DL (ref 0.2–1)
BUN SERPL-MCNC: 14 MG/DL (ref 7–18)
CALCIUM SERPL-MCNC: 8.9 MG/DL (ref 8.5–10.1)
CHLORIDE SERPL-SCNC: 100 MMOL/L (ref 98–107)
CO2 SERPL-SCNC: 27 MMOL/L (ref 21–32)
CREAT SERPL-MCNC: 0.9 MG/DL (ref 0.55–1.3)
DEPRECATED RDW RBC AUTO: 13.6 % (ref 11.9–15.9)
GLUCOSE SERPL-MCNC: 86 MG/DL (ref 74–106)
HCT VFR BLD CALC: 42.4 % (ref 35.4–49)
HGB BLD-MCNC: 14.5 GM/DL (ref 11.7–16.9)
MCH RBC QN AUTO: 29.9 PG (ref 25.7–33.7)
MCHC RBC AUTO-ENTMCNC: 34.2 G/DL (ref 32–35.9)
MCV RBC: 87.3 FL (ref 80–96)
PLATELET # BLD AUTO: 181 K/MM3 (ref 134–434)
PMV BLD: 10.4 FL (ref 7.5–11.1)
POTASSIUM SERPLBLD-SCNC: 3.4 MMOL/L (ref 3.5–5.1)
PROT SERPL-MCNC: 7.2 G/DL (ref 6.4–8.2)
RBC # BLD AUTO: 4.86 M/MM3 (ref 4–5.6)
SODIUM SERPL-SCNC: 139 MMOL/L (ref 136–145)
WBC # BLD AUTO: 9.1 K/MM3 (ref 4–10)

## 2019-03-07 RX ADMIN — NICOTINE SCH: 14 PATCH, EXTENDED RELEASE TRANSDERMAL at 10:27

## 2019-03-07 RX ADMIN — Medication SCH TAB: at 10:27

## 2019-03-07 RX ADMIN — CEPHALEXIN SCH MG: 250 CAPSULE ORAL at 17:19

## 2019-03-07 RX ADMIN — CEPHALEXIN SCH MG: 250 CAPSULE ORAL at 23:08

## 2019-03-07 RX ADMIN — Medication SCH MG: at 10:28

## 2019-03-07 RX ADMIN — Medication PRN MG: at 22:12

## 2019-03-07 RX ADMIN — CEPHALEXIN SCH MG: 250 CAPSULE ORAL at 06:25

## 2019-03-07 RX ADMIN — BACITRACIN ZINC SCH APPLIC: 500 OINTMENT TOPICAL at 10:28

## 2019-03-07 RX ADMIN — CEPHALEXIN SCH MG: 250 CAPSULE ORAL at 12:31

## 2019-03-07 NOTE — PN
S CIWA





- CIWA Score


Nausea/Vomitin


Muscle Tremors: 2


Anxiety: 2


Agitation: 2


Paroxysmal Sweats: 1-Minimal Palms Moist


Orientation: 0-Oriented


Tacttile Disturbances: 1-Very Mild Itch/Numbness


Auditory Disturbances: 1-Very Mild


Visual Disturbances: 0-None


Headache: 2-Mild


CIWA-Ar Total Score: 13





BHS COWS





- Scale


Resting Pulse: 2= -120


Sweatin= Chills/Flushing


Restless Observation: 3= Extraneous Movement


Pupil Size: 1= Pupils >than Normal


Bone or Joint Aches: 2= Severe Diffuse Aches


Runny Nose/ Eye Tearin= Nasal Congestion


GI Upset > 30mins: 2= Nausea/Diarrhea


Tremor Observation of Outstretched Hands: 1= Tremor Felt, Not Seen


Yawning Observation: 1= 1-2x During Session


Anxiety or Irritability: 2=Irritable/Anxious


Goose Flesh Skin: 0=Smooth Skin


COWS Score: 16





BHS Progress Note (SOAP)


Subjective: 





alert,irritable,anxious,interrupted sleep,pain in the body and back


Objective: 





19 09:25


 Vital Signs











Temperature  97.5 F L  19 06:48


 


Pulse Rate  89   19 06:48


 


Respiratory Rate  16   19 06:48


 


Blood Pressure  120/88   19 06:48


 


O2 Sat by Pulse Oximetry (%)      











19 09:25


labs pending


Assessment: 





19 09:26


withdrawal symptom


Plan: 





continue detox

## 2019-03-08 RX ADMIN — CEPHALEXIN SCH MG: 250 CAPSULE ORAL at 17:40

## 2019-03-08 RX ADMIN — CEPHALEXIN SCH MG: 250 CAPSULE ORAL at 23:01

## 2019-03-08 RX ADMIN — BACITRACIN ZINC SCH APPLIC: 500 OINTMENT TOPICAL at 11:47

## 2019-03-08 RX ADMIN — Medication SCH MG: at 11:47

## 2019-03-08 RX ADMIN — CEPHALEXIN SCH MG: 250 CAPSULE ORAL at 11:56

## 2019-03-08 RX ADMIN — CEPHALEXIN SCH MG: 250 CAPSULE ORAL at 05:21

## 2019-03-08 RX ADMIN — Medication SCH TAB: at 10:13

## 2019-03-08 RX ADMIN — NICOTINE SCH: 14 PATCH, EXTENDED RELEASE TRANSDERMAL at 10:13

## 2019-03-08 NOTE — PN
[facility] CIWA





- CIWA Score


Nausea/Vomitin-No Nausea/No Vomiting


Muscle Tremors: 3


Anxiety: 3


Agitation: 3


Paroxysmal Sweats: 3


Orientation: 0-Oriented


Tacttile Disturbances: 0-None


Auditory Disturbances: 0-None


Visual Disturbances: 0-None


Headache: 0-None Present


CIWA-Ar Total Score: 12





BHS COWS





- Scale


Resting Pulse: 1= MS 


Sweatin=Flushed/Facial Moisture


Restless Observation: 1= Difficult to Sit Still


Pupil Size: 0= Normal to Room Light


Bone or Joint Aches: 2= Severe Diffuse Aches


Runny Nose/ Eye Tearin= Runny Nose/Eyes


GI Upset > 30mins: 0= None


Tremor Observation of Outstretched Hands: 2= Slight Tremor Visible


Yawning Observation: 2= >3x During Session


Anxiety or Irritability: 2=Irritable/Anxious


Goose Flesh Skin: 0=Smooth Skin


COWS Score: 14





BHS Progress Note (SOAP)


Subjective: 





weak


sweats


shakes


interrupted sleep


body aches


Objective: 





19 13:01


 Vital Signs











Temperature  98.1 F   19 09:04


 


Pulse Rate  82   19 09:04


 


Respiratory Rate  18   19 09:04


 


Blood Pressure  117/75   19 09:04


 


O2 Sat by Pulse Oximetry (%)      








 Laboratory Tests











  19





  22:10 07:00 07:00


 


WBC    9.1


 


RBC    4.86


 


Hgb    14.5


 


Hct    42.4


 


MCV    87.3


 


MCH    29.9


 


MCHC    34.2


 


RDW    13.6


 


Plt Count    181  D


 


MPV    10.4


 


Sodium   


 


Potassium   


 


Chloride   


 


Carbon Dioxide   


 


Anion Gap   


 


BUN   


 


Creatinine   


 


Creat Clearance w eGFR   


 


Random Glucose   


 


Calcium   


 


Total Bilirubin   


 


AST   


 


ALT   


 


Alkaline Phosphatase   


 


Total Protein   


 


Albumin   


 


Urine Color  Yellow  


 


Urine Appearance  Clear  


 


Urine pH  6.0  


 


Ur Specific Gravity  1.030  


 


Urine Protein  1+ H  


 


Urine Glucose (UA)  Negative  


 


Urine Ketones  2+ H  


 


Urine Blood  Negative  


 


Urine Nitrite  Negative  


 


Urine Bilirubin  Negative  


 


Urine Urobilinogen  2.0  


 


Ur Leukocyte Esterase  Negative  


 


Urine WBC (Auto)  1  


 


Urine RBC (Auto)  None  


 


Urine Mucus  Few  


 


HIV 1&2 Antibody Screen   Negative 


 


HIV P24 Antigen   Negative 














  19





  07:00


 


WBC 


 


RBC 


 


Hgb 


 


Hct 


 


MCV 


 


MCH 


 


MCHC 


 


RDW 


 


Plt Count 


 


MPV 


 


Sodium  139


 


Potassium  3.4 L


 


Chloride  100


 


Carbon Dioxide  27


 


Anion Gap  11


 


BUN  14


 


Creatinine  0.9


 


Creat Clearance w eGFR  > 60


 


Random Glucose  86


 


Calcium  8.9


 


Total Bilirubin  0.7


 


AST  28


 


ALT  41


 


Alkaline Phosphatase  85


 


Total Protein  7.2


 


Albumin  3.8


 


Urine Color 


 


Urine Appearance 


 


Urine pH 


 


Ur Specific Gravity 


 


Urine Protein 


 


Urine Glucose (UA) 


 


Urine Ketones 


 


Urine Blood 


 


Urine Nitrite 


 


Urine Bilirubin 


 


Urine Urobilinogen 


 


Ur Leukocyte Esterase 


 


Urine WBC (Auto) 


 


Urine RBC (Auto) 


 


Urine Mucus 


 


HIV 1&2 Antibody Screen 


 


HIV P24 Antigen 








labs noted


low potassium 3.4 


aaox2


ambulating


no acute distress


Assessment: 





19 13:02


withdrawal sx


Plan: 





continue detox


increase fluids


kdur ordered

## 2019-03-09 VITALS — DIASTOLIC BLOOD PRESSURE: 70 MMHG | SYSTOLIC BLOOD PRESSURE: 111 MMHG | HEART RATE: 101 BPM | TEMPERATURE: 99 F

## 2019-03-09 RX ADMIN — CEPHALEXIN SCH MG: 250 CAPSULE ORAL at 18:38

## 2019-03-09 RX ADMIN — CEPHALEXIN SCH MG: 250 CAPSULE ORAL at 05:50

## 2019-03-09 RX ADMIN — NICOTINE SCH: 14 PATCH, EXTENDED RELEASE TRANSDERMAL at 10:47

## 2019-03-09 RX ADMIN — CEPHALEXIN SCH MG: 250 CAPSULE ORAL at 13:00

## 2019-03-09 RX ADMIN — Medication SCH: at 11:00

## 2019-03-09 RX ADMIN — Medication SCH TAB: at 10:47

## 2019-03-09 NOTE — PN
BHS Progress Note


Note: 





met w/ pt , discussed risks of leaving AMA  , verbalizes  understanding,  

insisting on leaving states wants to go home.

## 2019-03-09 NOTE — PN
BHS Progress Note (SOAP)


Subjective: 





sweats


shakes


chills


body aches


Objective: 





03/09/19 09:35


 Vital Signs











Temperature  97.9 F   03/09/19 06:00


 


Pulse Rate  73   03/09/19 06:00


 


Respiratory Rate  18   03/09/19 06:00


 


Blood Pressure  108/72   03/09/19 06:00


 


O2 Sat by Pulse Oximetry (%)      








 Laboratory Tests











  03/06/19 03/07/19 03/07/19





  22:10 07:00 07:00


 


WBC    9.1


 


RBC    4.86


 


Hgb    14.5


 


Hct    42.4


 


MCV    87.3


 


MCH    29.9


 


MCHC    34.2


 


RDW    13.6


 


Plt Count    181  D


 


MPV    10.4


 


Sodium   


 


Potassium   


 


Chloride   


 


Carbon Dioxide   


 


Anion Gap   


 


BUN   


 


Creatinine   


 


Creat Clearance w eGFR   


 


Random Glucose   


 


Calcium   


 


Total Bilirubin   


 


AST   


 


ALT   


 


Alkaline Phosphatase   


 


Total Protein   


 


Albumin   


 


Urine Color  Yellow  


 


Urine Appearance  Clear  


 


Urine pH  6.0  


 


Ur Specific Gravity  1.030  


 


Urine Protein  1+ H  


 


Urine Glucose (UA)  Negative  


 


Urine Ketones  2+ H  


 


Urine Blood  Negative  


 


Urine Nitrite  Negative  


 


Urine Bilirubin  Negative  


 


Urine Urobilinogen  2.0  


 


Ur Leukocyte Esterase  Negative  


 


Urine WBC (Auto)  1  


 


Urine RBC (Auto)  None  


 


Urine Mucus  Few  


 


HIV 1&2 Antibody Screen   Negative 


 


HIV P24 Antigen   Negative 














  03/07/19





  07:00


 


WBC 


 


RBC 


 


Hgb 


 


Hct 


 


MCV 


 


MCH 


 


MCHC 


 


RDW 


 


Plt Count 


 


MPV 


 


Sodium  139


 


Potassium  3.4 L


 


Chloride  100


 


Carbon Dioxide  27


 


Anion Gap  11


 


BUN  14


 


Creatinine  0.9


 


Creat Clearance w eGFR  > 60


 


Random Glucose  86


 


Calcium  8.9


 


Total Bilirubin  0.7


 


AST  28


 


ALT  41


 


Alkaline Phosphatase  85


 


Total Protein  7.2


 


Albumin  3.8


 


Urine Color 


 


Urine Appearance 


 


Urine pH 


 


Ur Specific Gravity 


 


Urine Protein 


 


Urine Glucose (UA) 


 


Urine Ketones 


 


Urine Blood 


 


Urine Nitrite 


 


Urine Bilirubin 


 


Urine Urobilinogen 


 


Ur Leukocyte Esterase 


 


Urine WBC (Auto) 


 


Urine RBC (Auto) 


 


Urine Mucus 


 


HIV 1&2 Antibody Screen 


 


HIV P24 Antigen 











aaox3


ambulating


no acute distress


Assessment: 





03/09/19 09:52


withdrawal sx


Plan: 





continue detox


increase fluids


baclofen tid


d/c in am

## 2019-03-09 NOTE — DS
BHS Detox Discharge Summary


Admission Date: 


03/06/19





Discharge Date: 03/09/19





- Physical Exam Results


Vital Signs: 


 Vital Signs











Temperature  99.0 F   03/09/19 18:03


 


Pulse Rate  101 H  03/09/19 18:03


 


Respiratory Rate  16   03/09/19 18:03


 


Blood Pressure  111/70   03/09/19 18:03


 


O2 Sat by Pulse Oximetry (%)      














- Treatment


Hospital Course: Detox Protocol Followed





- Medication


Discharge Medications: 


Ambulatory Orders





NK [No Known Home Medication]  10/26/17 











- AMA


Did Patient Leave Against Medical Advice: Yes

## 2019-04-25 ENCOUNTER — HOSPITAL ENCOUNTER (INPATIENT)
Dept: HOSPITAL 74 - YASAS | Age: 29
LOS: 3 days | Discharge: HOME | DRG: 773 | End: 2019-04-28
Attending: SURGERY | Admitting: SURGERY
Payer: COMMERCIAL

## 2019-04-25 VITALS — BODY MASS INDEX: 27.3 KG/M2

## 2019-04-25 DIAGNOSIS — F12.20: ICD-10-CM

## 2019-04-25 DIAGNOSIS — F10.230: Primary | ICD-10-CM

## 2019-04-25 DIAGNOSIS — F17.213: ICD-10-CM

## 2019-04-25 DIAGNOSIS — F32.9: ICD-10-CM

## 2019-04-25 DIAGNOSIS — F11.23: ICD-10-CM

## 2019-04-25 PROCEDURE — HZ2ZZZZ DETOXIFICATION SERVICES FOR SUBSTANCE ABUSE TREATMENT: ICD-10-PCS | Performed by: SURGERY

## 2019-04-25 RX ADMIN — Medication SCH MG: at 22:44

## 2019-04-25 RX ADMIN — Medication PRN MG: at 22:45

## 2019-04-25 NOTE — HP
COWS





- Scale


Resting Pulse: 2= -120


Sweatin=Flushed/Facial Moisture


Restless Observation: 0= Sits Still


Pupil Size: 1= Pupils >than Normal


Bone or Joint Aches: 4=Acute Joint/Muscle Pain


Runny Nose/ Eye Tearin= Nasal Congestion


GI Upset > 30mins: 2= Nausea/Diarrhea (diArrhea x 2)


Tremor Observation: 2= Slight Tremor Visible


Yawning Observation: 0= None


Anxiety or Irritability: 0= None


Goose Flesh Skin: 3=Piloerection


COWS Score: 17





CIWA Score


Nausea/Vomiting: 3


Muscle Tremors: 4-Moderate,w/Arms Extend


Anxiety: 3


Agitation: 3


Paroxysmal Sweats: 2


Orientation: 0-Oriented


Tacttile Disturbances: 0-None


Auditory Disturbances: 0-None


Visual Disturbances: 0-None


Headache: 3-Moderate


CIWA-Ar Total Score: 18





- Admission Criteria


[facility] Guidelines: Admission for Medically Managed Detox: 


Requires at least one of the followin. CIWA greater than 12


2. Seizures within the past 24 hours


3. Delirium tremens within the past 24 hours


4. Hallucinations within the past 24 hours


5. Acute intervention needed for co  occurring medical disorder


6. Acute intervention needed for co  occurring psychiatric disorder


7. Severe withdrawal that cannot be handled at a lower level of care (continued


    vomiting, continued diarrhea, abnormal vital signs) requiring intravenous


    medication and/or fluids


8. Pregnancy








Admission ROS [facility]


Chief Complaint: 





Heroin and alcohol withdrawal symptoms


Allergies/Adverse Reactions: 


 Allergies











Allergy/AdvReac Type Severity Reaction Status Date / Time


 


No Known Allergies Allergy   Verified 19 21:45











History of Present Illness: 





28 years old male with 10 years of alcohol dependence and 2 years of heroin 

dependence is seeking admission to detox. Patient has been in multiple detox 

and reports insignificant period of sobriety. He has past medical history of 

depression and denies suicidal ideation at this time


Exam Limitations: No Limitations





- Ebola screening


Have you traveled outside of the country in the last 21 days: No


Have you had contact with anyone from an Ebola affected area: No


Have you been sick,other than usual withdrawal symptoms: No


Do you have a fever: No





- Review of Systems


Constitutional: Chills, Loss of Appetite, Night Sweats, Changes in sleep


EENT: reports: Nose Congestion


Respiratory: reports: No Symptoms reported


Cardiac: reports: No Symptoms Reported


GI: reports: Diarrhea, Poor Appetite, Poor Fluid Intake, Abdominal cramping


: reports: No Symptoms Reported


Musculoskeletal: reports: Back Pain, Joint Pain, Muscle Pain, Muscle Weakness


Integumentary: reports: Dryness, Flushing


Neuro: reports: Headache, Tingling, Tremors


Endocrine: reports: No Symptoms Reported


Hematology: reports: No Symptoms Reported


Psychiatric: reports: Mood/Affect Appropiate, Orientated x3


Other Systems: Reviewed and Negative





Patient History





- Patient Medical History


Hx Anemia: No


Hx Asthma: No


Hx Chronic Obstructive Pulmonary Disease (COPD): No


Hx Cancer: No


Hx Cardiac Disorders: No


Hx Congestive Heart Failure: No


Hx Hypertension: No


Hx Hypercholesterolemia: No


Hx Pacemaker: No


HX Cerebrovascular Accident: No


Hx Seizures: No


Hx Dementia: No


Hx Diabetes: No


Hx Gastrointestinal Disorders: No


Hx Liver Disease: No


Hx Genitourinary Disorders: No


Hx Sexually Transmitted Disorders: No


Hx Renal Disease (ESRD): No


Hx Thyroid Disease: No


Hx Human Immunodeficiency Virus (HIV): No ( NEGATIVE 2019)


Hx Hepatitis C: No ( NEGATIVE.)


Hx Depression: No


Hx Suicide Attempt: No (PATIENT DENIES CURRENT SI / HI.)


Hx Bipolar Disorder: No


Hx Schizophrenia: No





- Patient Surgical History


Past Surgical History: No





- PPD History


Documented Results: Negative w/proof


Implanted On Prior [facility] Admission?: Yes


Date: 18


PPD to be Administered?: No





- Reproductive History


Patient is a Female of Child Bearing Age (11 -55 yrs old): No (Male)





- Smoking Cessation


Smoking history: Current every day smoker


Have you smoked in the past 12 months: Yes


Aproximately how many cigarettes per day: 20


Cigars Per Day: 0


Hx Chewing Tobacco Use: No


Initiated information on smoking cessation: Yes


'Breaking Loose' booklet given: 19





- Substance & Tx. History


Hx Alcohol Use: Yes


Hx Substance Use: Yes


Substance Use Type: Alcohol, Marijuana, Opiates


Hx Substance Use Treatment: Yes ([facility])





- Substances abused


  ** Alcohol


Substance route: Oral


Frequency: Daily


Amount used: 1 pint of rum


Age of first use: 18


Date of last use: 19





  ** Heroin


Substance route: Inhalation


Frequency: Daily


Amount used: 15 bags


Age of first use: 24


Date of last use: 19





Family Disease History





- Family Disease History


Family Disease History: Diabetes: Grandparent, Sister





Admission Physical Exam [facility]





- Physical


General Appearance: Yes: Moderate Distress, Tremorous, Irritable, Sweating, 

Anxious


HEENTM: Yes: EOMI, Normal ENT Inspection, Normal Voice


Respiratory: Yes: Lungs Clear, Normal Breath Sounds, No Respiratory Distress


Neck: Yes: Supple


Breast: Yes: Breast Exam Deferred


Cardiology: Yes: Tachycardia


Abdominal: Yes: Normal Bowel Sounds, Soft


Genitourinary: Yes: Within Normal Limits


Back: Yes: Normal Inspection


Extremities: Yes: Tremors


Neurological: Yes: Alert, Normal Mood/Affect


Integumentary: Yes: Warm


Lymphatic: Yes: Within Normal Limits





- Diagnostic


(1) Alcohol dependence with uncomplicated withdrawal


Current Visit: Yes   Status: Chronic   





(2) Opioid dependence with withdrawal


Current Visit: Yes   Status: Acute   





(3) Cannabis dependence, uncomplicated


Current Visit: Yes   Status: Chronic   





(4) Depression


Current Visit: Yes   Status: Chronic   


Qualifiers: 


   Depression Type: unspecified   Qualified Code(s): F32.9 - Major depressive 

disorder, single episode, unspecified   





(5) Nicotine dependence


Current Visit: Yes   Status: Chronic   


Qualifiers: 


   Nicotine product type: cigarettes   Substance use status: uncomplicated   

Qualified Code(s): F17.210 - Nicotine dependence, cigarettes, uncomplicated   





Cleared for Admission [facility]





- Detox or Rehab


[facility] Level of Care: Medically Managed


Detox Regimen/Protocol: Methadone/Librium





Inpatient Rehab Admission





- Rehab Decision to Admit


Inpatient rehab admission?: No

## 2019-04-26 RX ADMIN — Medication SCH MG: at 22:07

## 2019-04-26 RX ADMIN — Medication SCH TAB: at 10:07

## 2019-04-26 RX ADMIN — NICOTINE SCH: 14 PATCH, EXTENDED RELEASE TRANSDERMAL at 10:07

## 2019-04-26 NOTE — EKG
Test Reason : 

Blood Pressure : ***/*** mmHG

Vent. Rate : 080 BPM     Atrial Rate : 080 BPM

   P-R Int : 162 ms          QRS Dur : 086 ms

    QT Int : 378 ms       P-R-T Axes : 066 070 031 degrees

   QTc Int : 435 ms

 

NORMAL SINUS RHYTHM WITH SINUS ARRHYTHMIA

NORMAL ECG

WHEN COMPARED WITH ECG OF 17-FEB-2019 10:07,

NO SIGNIFICANT CHANGE WAS FOUND

Confirmed by OSWALDO SANTANA MD ([id number]) on 4/26/2019 11:30:21 AM

 

Referred By:             Confirmed By:OSWALDO SANTANA MD

## 2019-04-26 NOTE — PN
S CIWA





- CIWA Score


Nausea/Vomitin-No Nausea/No Vomiting


Muscle Tremors: 2


Anxiety: 0-No Anxiety, at Ease


Agitation: 0-Normal Activity


Paroxysmal Sweats: 3


Orientation: 0-Oriented


Tacttile Disturbances: 3-Moderate Itch/Numb/Burn


Auditory Disturbances: 0-None


Visual Disturbances: 3-Moderate Sensitivity


Headache: 0-None Present


CIWA-Ar Total Score: 11





BHS COWS





- Scale


Resting Pulse: 0= [place] 80 or Below


Sweatin= Chills/Flushing


Restless Observation: 0= Sits Still


Pupil Size: 0= Normal to Room Light


Bone or Joint Aches: 1= Mild Discomfort


Runny Nose/ Eye Tearin= None


GI Upset > 30mins: 0= None


Tremor Observation of Outstretched Hands: 2= Slight Tremor Visible


Yawning Observation: 1= 1-2x During Session


Anxiety or Irritability: 2=Irritable/Anxious


Goose Flesh Skin: 3=Piloerection


COWS Score: 10





BHS Progress Note (SOAP)


Subjective: 





Stomach Cramping, Hot / Cold Sensations, Chills, Tremors.


Objective: 


PATIENT A & O X 3, OBSERVED AMBULATING ON UNIT UNASSISTED. IN NO ACUTE DISTRESS.





19 17:06


 Vital Signs











Temperature  97.6 F   19 13:16


 


Pulse Rate  62   19 13:16


 


Respiratory Rate  18   19 13:16


 


Blood Pressure  92/58 L  19 13:16


 


O2 Sat by Pulse Oximetry (%)      











PATIENT REFUSED TO HAVE ADMISSION LABS DRAWN.





19 17:07





Assessment: 





19 17:07


WITHDRAWAL SYMPTOMS.


Plan: 





CONTINUE DETOX.


INCREASE DAILY PO FLUID / WATER INTAKE.

## 2019-04-27 RX ADMIN — NICOTINE SCH: 14 PATCH, EXTENDED RELEASE TRANSDERMAL at 11:21

## 2019-04-27 RX ADMIN — Medication SCH MG: at 22:23

## 2019-04-27 RX ADMIN — Medication PRN MG: at 23:28

## 2019-04-27 RX ADMIN — Medication SCH TAB: at 11:20

## 2019-04-27 NOTE — PN
S CIWA





- CIWA Score


Nausea/Vomitin-No Nausea/No Vomiting


Muscle Tremors: None


Anxiety: 2


Agitation: 1-Slight > Activity


Paroxysmal Sweats: 3


Orientation: 0-Oriented


Tacttile Disturbances: 2-Mild Itch/Numbness/Burn


Auditory Disturbances: 0-None


Visual Disturbances: 2-Mild Sensitivity


Headache: 0-None Present


CIWA-Ar Total Score: 10





BHS COWS





- Scale


Resting Pulse: 0= [place] 80 or Below


Sweatin= Chills/Flushing


Restless Observation: 1= Difficult to Sit Still


Pupil Size: 0= Normal to Room Light


Bone or Joint Aches: 0= None


Runny Nose/ Eye Tearin= Nasal Congestion


GI Upset > 30mins: 1= Stomach Cramp


Tremor Observation of Outstretched Hands: 2= Slight Tremor Visible


Yawning Observation: 1= 1-2x During Session


Anxiety or Irritability: 2=Irritable/Anxious


Goose Flesh Skin: 3=Piloerection


COWS Score: 12





BHS Progress Note (SOAP)


Subjective: 





Stomach Cramping, Hot / Cold Sensations, Sweating, Anxious.


Objective: 


PATIENT A & O X 3, OBSERVED AMBULATING ON UNIT UNASSISTED. IN NO ACUTE DISTRESS.





19 16:50


 Vital Signs











Temperature  96.9 F L  19 14:09


 


Pulse Rate  78   19 14:09


 


Respiratory Rate  18   19 14:09


 


Blood Pressure  114/78   19 14:09


 


O2 Sat by Pulse Oximetry (%)      











PATIENT REFUSED TO HAVE ADMISSION LABS DRAWN.





19 16:50





Assessment: 





19 16:50


WITHDRAWAL SYMPTOMS.


Plan: 





CONTINUE DETOX.


INCREASE DAILY PO FLUID INTAKE.

## 2019-04-28 VITALS — TEMPERATURE: 96.9 F | DIASTOLIC BLOOD PRESSURE: 83 MMHG | SYSTOLIC BLOOD PRESSURE: 119 MMHG | HEART RATE: 72 BPM

## 2019-04-28 RX ADMIN — NICOTINE SCH: 14 PATCH, EXTENDED RELEASE TRANSDERMAL at 09:11

## 2019-04-28 RX ADMIN — Medication SCH TAB: at 09:09

## 2019-04-28 NOTE — DS
[facility] Detox Discharge Summary


Admission Date: 


04/25/19





Discharge Date: 04/28/19





- History


Present History: Alcohol Dependence, Opioid Dependence


Additional Comments: 





28 years old male admitted on 04/25/19 for alcohol and opiate withdrawal 

stabilization


feeling better wants to go home today 


alert no acute distress denies suicidal ideation


Pertinent Past History: 





bring in medication list and lab report to aftercare appointment





- Physical Exam Results


Vital Signs: 


 Vital Signs











Temperature  96.9 F L  04/28/19 14:32


 


Pulse Rate  72   04/28/19 14:32


 


Respiratory Rate  18   04/28/19 14:32


 


Blood Pressure  119/83   04/28/19 14:32


 


O2 Sat by Pulse Oximetry (%)      











Pertinent Admission Physical Exam Findings: 





alcohol and opiate withdrawal sx





- Treatment


Hospital Course: Detox Protocol Followed, Detoxed Safely, Responded well, 

Discharged Condition Good, Rehab Referral Accepted


Patient has Accepted a Rehab Referral to: new focus





- Medication


Discharge Medications: 


Ambulatory Orders





NK [No Known Home Medication]  10/26/17 











- Diagnosis


(1) Opioid dependence with withdrawal


Current Visit: Yes   Status: Acute   





(2) Alcohol dependence with uncomplicated withdrawal


Current Visit: Yes   Status: Acute   





(3) Nicotine dependence


Current Visit: Yes   Status: Acute   


Qualifiers: 


   Nicotine product type: cigarettes   Substance use status: in withdrawal   

Qualified Code(s): F17.213 - Nicotine dependence, cigarettes, with withdrawal   





- AMA


Did Patient Leave Against Medical Advice: No

## 2019-04-28 NOTE — PN
[facility] CIWA





- CIWA Score


Nausea/Vomitin-Mild Nausea/No Vomiting


Muscle Tremors: 1-None Visible, but Felt


Anxiety: 1-Mildly Anxious


Agitation: 1-Slight > Activity


Paroxysmal Sweats: 1-Minimal Palms Moist


Orientation: 0-Oriented


Tacttile Disturbances: 0-None


Auditory Disturbances: 0-None


Visual Disturbances: 0-None


Headache: 1-Very Mild


CIWA-Ar Total Score: 6





BHS COWS





- Scale


Resting Pulse: 0= [place] 80 or Below


Sweatin= Chills/Flushing


Restless Observation: 0= Sits Still


Pupil Size: 0= Normal to Room Light


Bone or Joint Aches: 1= Mild Discomfort


Runny Nose/ Eye Tearin= Nasal Congestion


GI Upset > 30mins: 1= Stomach Cramp


Tremor Observation of Outstretched Hands: 1= Tremor Felt, Not Seen


Yawning Observation: 1= 1-2x During Session


Anxiety or Irritability: 2=Irritable/Anxious


Goose Flesh Skin: 0=Smooth Skin


COWS Score: 8





BHS Progress Note (SOAP)


Subjective: 





anxious 


vistaril 50 mg x 1


Objective: 





19 15:04


 Vital Signs











Temperature  96.9 F L  19 14:32


 


Pulse Rate  72   19 14:32


 


Respiratory Rate  18   19 14:32


 


Blood Pressure  119/83   19 14:32


 


O2 Sat by Pulse Oximetry (%)      











19 15:05


last lab 2019


Assessment: 





19 15:05


mild withdrawal sx


Plan: 





continue detox

## 2019-07-08 ENCOUNTER — HOSPITAL ENCOUNTER (INPATIENT)
Dept: HOSPITAL 74 - YASAS | Age: 29
LOS: 4 days | Discharge: HOME | DRG: 773 | End: 2019-07-12
Attending: SURGERY | Admitting: SURGERY
Payer: COMMERCIAL

## 2019-07-08 VITALS — BODY MASS INDEX: 26.6 KG/M2

## 2019-07-08 DIAGNOSIS — F17.210: ICD-10-CM

## 2019-07-08 DIAGNOSIS — F10.230: Primary | ICD-10-CM

## 2019-07-08 DIAGNOSIS — F12.20: ICD-10-CM

## 2019-07-08 DIAGNOSIS — R45.89: ICD-10-CM

## 2019-07-08 DIAGNOSIS — Z91.89: ICD-10-CM

## 2019-07-08 DIAGNOSIS — F11.23: ICD-10-CM

## 2019-07-08 PROCEDURE — HZ2ZZZZ DETOXIFICATION SERVICES FOR SUBSTANCE ABUSE TREATMENT: ICD-10-PCS | Performed by: SURGERY

## 2019-07-08 RX ADMIN — Medication SCH MG: at 22:53

## 2019-07-08 RX ADMIN — Medication PRN MG: at 22:53

## 2019-07-08 NOTE — HP
COWS





- Scale


Resting Pulse: 1= [place] 


Sweatin=Flushed/Facial Moisture


Restless Observation: 5= Unable to Sit Still


Pupil Size: 1= Pupils >than Normal


Bone or Joint Aches: 4=Acute Joint/Muscle Pain


Runny Nose/ Eye Tearin= Constantly Teary/Runny


GI Upset > 30mins: 1= Stomach Cramp


Tremor Observation: 0= None


Yawning Observation: 1= 1-2x During Session


Anxiety or Irritability: 2=Irritable/Anxious


Goose Flesh Skin: 0=Smooth Skin


COWS Score: 21





CIWA Score


Nausea/Vomitin-No Nausea/No Vomiting


Muscle Tremors: None


Anxiety: 4-Mod. Anxious/Guarded


Agitation: 4-Moderately Restless


Paroxysmal Sweats: 3


Orientation: 0-Oriented


Tacttile Disturbances: 0-None


Auditory Disturbances: 0-None


Visual Disturbances: 0-None


Headache: 1-Very Mild


CIWA-Ar Total Score: 12





- Admission Criteria


[facility] Guidelines: Admission for Medically Managed Detox: 


Requires at least one of the followin. CIWA greater than 12


2. Seizures within the past 24 hours


3. Delirium tremens within the past 24 hours


4. Hallucinations within the past 24 hours


5. Acute intervention needed for co  occurring medical disorder


6. Acute intervention needed for co  occurring psychiatric disorder


7. Severe withdrawal that cannot be handled at a lower level of care (continued


    vomiting, continued diarrhea, abnormal vital signs) requiring intravenous


    medication and/or fluids


8. Pregnancy





Patient presents the following: CIWA greater than 12


Admission Criteria Met: Admission criteria met





Admission [facility]


Chief Complaint: 





c/o withdrawal sx's


Allergies/Adverse Reactions: 


 Allergies











Allergy/AdvReac Type Severity Reaction Status Date / Time


 


chlordiazepoxide Allergy Intermediate Hives Verified 19 18:59





[From Librium]     











History of Present Illness: 





28 Y.O MALE WITH OPIOID AND ALCOHOL DEPENDENCE HERE FOR DETOX. PRESENTS TODAY 

WITH C/O WITHDRAWAL SXS. COWS 21/CIWA 12. SELF REFERRED. LAST HERE 2019. 

REPORTS RELAPSING SOON AFTER. DENIES ANY SIGNIFICANT PERIOD OF CLEAN TIME. 

DENIES HX/O SEIZURE, DRUG OVERDOSE, SI/HI, AVH. LIVES WITH FAMILY, UNEMPLOYED, 

DENIES LEGALS. 


Exam Limitations: No Limitations





- Ebola screening


Have you traveled outside of the country in the last 21 days: No (N)


Have you had contact with anyone from an Ebola affected area: No


Have you been sick,other than usual withdrawal symptoms: No


Do you have a fever: No





- Review of Systems


Constitutional: Chills, Loss of Appetite, Night Sweats, Changes in sleep


EENT: reports: Other (WATERY EYES RUNNY NOSE)


Respiratory: reports: No Symptoms reported


Cardiac: reports: No Symptoms Reported


GI: reports: Poor Fluid Intake, Abdominal cramping, Tarry Stools


: reports: No Symptoms Reported


Musculoskeletal: reports: Back Pain, Joint Pain, Neck Pain


Integumentary: reports: Sweating


Neuro: reports: Weakness (WEAKNESS)


Endocrine: reports: No Symptoms Reported


Hematology: reports: No Symptoms Reported


Psychiatric: reports: Orientated x3, Agitated (IRRITABLE), Anxious


Other Systems: Reviewed and Negative





Patient History





- Patient Medical History


Hx Anemia: No


Hx Asthma: No


Hx Chronic Obstructive Pulmonary Disease (COPD): No


Hx Cancer: No


Hx Cardiac Disorders: No


Hx Congestive Heart Failure: No


Hx Hypertension: No


Hx Hypercholesterolemia: No


Hx Pacemaker: No


HX Cerebrovascular Accident: No


Hx Seizures: No


Hx Dementia: No


Hx Diabetes: No


Hx Gastrointestinal Disorders: No


Hx Liver Disease: No


Hx Genitourinary Disorders: No


Hx Sexually Transmitted Disorders: No


Hx Renal Disease (ESRD): No


Hx Thyroid Disease: No


Hx Human Immunodeficiency Virus (HIV): No ( NEGATIVE 2019)


Hx Hepatitis C: No ( )


Hx Depression: No


Hx Suicide Attempt: No


Hx Bipolar Disorder: No


Hx Schizophrenia: No


Other Medical History: DENIES





- Patient Surgical History


Past Surgical History: No


Hx Neurologic Surgery: No


Hx Cataract Extraction: No


Hx Cardiac Surgery: No


Hx Lung Surgery: No


Hx Breast Surgery: No


Hx Breast Biopsy: No


Hx Abdominal Surgery: No


Hx Appendectomy: No


Hx Cholecystectomy: No


Hx Genitourinary Surgery: No


Hx  Section: No


Hx Orthopedic Surgery: No


Anesthesia Reaction: No





- PPD History


Previous Implant?: Yes


Date: 18


Results: 0MM


PPD to be Administered?: Yes





- Smoking Cessation


Smoking history: Current every day smoker


Have you smoked in the past 12 months: Yes


Aproximately how many cigarettes per day: 20


Cigars Per Day: 0


Hx Chewing Tobacco Use: No


Initiated information on smoking cessation: Yes


'Breaking Loose' booklet given: 19





- Substance & Tx. History


Hx Alcohol Use: Yes


Hx Substance Use: Yes


Substance Use Type: Alcohol, Heroin, Marijuana


Hx Substance Use Treatment: Yes ([facility])





- Substances abused


  ** Heroin


Substance route: Inhalation


Frequency: Daily


Amount used: 10-15bags


Age of first use: 24


Date of last use: 19





  ** Alcohol


Other (specify): VODKA/RUM


Substance route: Oral


Frequency: Daily


Amount used: 1 pint of rum


Age of first use: 18


Date of last use: 19





  ** Marijuana/Hashish


Substance route: Smoking


Frequency: 3-6 times per week


Amount used: 1 bag


Age of first use: 18


Date of last use: 19





Family Disease History





- Family Disease History


Family Disease History: Diabetes: Grandparent, Sister





Admission Physical Exam [facility]





- Vital Signs


Vital Signs: 


 Vital Signs - 24 hr











  19





  19:01 20:20


 


Temperature 99.5 F 99.5 F


 


Pulse Rate 88 88


 


Respiratory 17 17





Rate  


 


Blood Pressure 147/91 147/91














- Physical


General Appearance: Yes: Moderate Distress, Tremorous (felt), Irritable, 

Sweating, Anxious


HEENTM: Yes: EOMI, Normocephalic, Normal Voice, Pharynx Normal, Nasal Congestion

, Rhinorrhea, Other (dialated pupils watery eyes)


Respiratory: Yes: Chest Non-Tender, Lungs Clear, Normal Breath Sounds, No 

Respiratory Distress, No Accessory Muscle Use


Neck: Yes: No masses,lesions,Nodules, Supple, Trachea in good position


Breast: Yes: Breast Exam Deferred


Cardiology: Yes: Regular Rhythm, Regular Rate, S1, S2


Abdominal: Yes: Soft, Increased Bowel Sounds, Tenderness (llq)


Genitourinary: Yes: Within Normal Limits


Back: Yes: Normal Inspection


Musculoskeletal: Yes: full range of Motion, Gait Steady


Extremities: Yes: Normal Capillary Refill, Normal Range of Motion, Non-Tender, 

Tremors (felt)


Neurological: Yes: Fully Oriented, Alert, Motor Strength 5/5, Numbness (left 

index finger/middle finger)


Integumentary: Yes: Clammy


Lymphatic: Yes: Within Normal Limits





- Diagnostic


(1) At risk for dehydration due to poor fluid intake


Current Visit: Yes   Status: Acute   





(2) Depressed affect


Current Visit: Yes   Status: Suspected   Comment: declines psych consult   





(3) Alcohol dependence with uncomplicated withdrawal


Current Visit: Yes   Status: Acute   





(4) Nicotine dependence


Current Visit: Yes   Status: Chronic   


Qualifiers: 


   Nicotine product type: cigarettes   Substance use status: in withdrawal   

Qualified Code(s): F17.213 - Nicotine dependence, cigarettes, with withdrawal   





(5) Opioid dependence with withdrawal


Current Visit: Yes   Status: Acute   





(6) Cannabis dependence, uncomplicated


Current Visit: Yes   Status: Acute   





Cleared for Admission S





- Detox or Rehab


[facility] Level of Care: Medically Managed


Detox Regimen/Protocol: Methadone/Librium


Claeared for Rehab Admission: No





Breathalyzer





- Breathalyzer


Breathalyzer: 0





Urine Drug Screen





- Test Device


Lot number: [id number]


Expiration date: 21





- Control


Is test valid?: Yes





- Results


Drug screen NEGATIVE: No


Urine drug screen results: THC-Marijuana, FEN-Fentanyl





Inpatient Rehab Admission





- Rehab Decision to Admit


Inpatient rehab admission?: No

## 2019-07-09 LAB
APPEARANCE UR: CLEAR
BACTERIA # UR AUTO: 5.7 /HPF
BILIRUB UR STRIP.AUTO-MCNC: NEGATIVE MG/DL
CASTS URNS QL MICRO: 23 /LPF (ref 0–8)
COLOR UR: YELLOW
EPITH CASTS URNS QL MICRO: 2 /HPF
KETONES UR QL STRIP: NEGATIVE
LEUKOCYTE ESTERASE UR QL STRIP.AUTO: (no result)
NITRITE UR QL STRIP: NEGATIVE
PH UR: 6.5 [PH] (ref 5–8)
PROT UR QL STRIP: NEGATIVE
PROT UR QL STRIP: NEGATIVE
RBC # BLD AUTO: 1 /HPF (ref 0–4)
SP GR UR: 1.02 (ref 1.01–1.03)
UROBILINOGEN UR STRIP-MCNC: 0.2 MG/DL (ref 0.2–1)
WBC # UR AUTO: 12 /HPF (ref 0–5)

## 2019-07-09 RX ADMIN — Medication PRN MG: at 22:30

## 2019-07-09 RX ADMIN — Medication SCH MG: at 22:30

## 2019-07-09 RX ADMIN — Medication SCH TAB: at 10:34

## 2019-07-09 RX ADMIN — NICOTINE SCH: 21 PATCH TRANSDERMAL at 10:35

## 2019-07-09 NOTE — PN
S CIWA





- CIWA Score


Nausea/Vomitin-Mild Nausea/No Vomiting


Muscle Tremors: 4-Moderate,w/Arms Extend


Anxiety: 3


Agitation: 2


Paroxysmal Sweats: 1-Minimal Palms Moist


Orientation: 1-Uncertain about Date


Tacttile Disturbances: 1-Very Mild Itch/Numbness


Auditory Disturbances: 0-None


Visual Disturbances: 0-None


Headache: 1-Very Mild


CIWA-Ar Total Score: 14





[facility] COWS





- Scale


Resting Pulse: 0= [place] 80 or Below


Sweatin= Chills/Flushing


Restless Observation: 0= Sits Still


Pupil Size: 0= Normal to Room Light


Bone or Joint Aches: 2= Severe Diffuse Aches


Runny Nose/ Eye Tearin= Nasal Congestion


GI Upset > 30mins: 2= Nausea/Diarrhea


Tremor Observation of Outstretched Hands: 2= Slight Tremor Visible


Yawning Observation: 2= >3x During Session


Anxiety or Irritability: 2=Irritable/Anxious


Goose Flesh Skin: 3=Piloerection


COWS Score: 15





[facility] Progress Note (SOAP)


Subjective: 





body aches tremor headaches


Objective: 





19 11:42


 Vital Signs











Temperature  98.2 F   19 09:09


 


Pulse Rate  69   19 09:09


 


Respiratory Rate  18   19 09:09


 


Blood Pressure  115/84   19 09:09


 


O2 Sat by Pulse Oximetry (%)      











19 11:43


lab see 2019 result


Assessment: 





19 11:43


alcohol withdrawal sx


Plan: 





continue alcohol detox

## 2019-07-10 RX ADMIN — Medication SCH TAB: at 10:31

## 2019-07-10 RX ADMIN — Medication PRN MG: at 21:21

## 2019-07-10 RX ADMIN — NICOTINE SCH: 21 PATCH TRANSDERMAL at 10:31

## 2019-07-10 RX ADMIN — Medication SCH MG: at 21:21

## 2019-07-10 NOTE — PN
S CIWA





- CIWA Score


Nausea/Vomitin-Mild Nausea/No Vomiting


Muscle Tremors: 2


Anxiety: 3


Agitation: 4-Moderately Restless


Paroxysmal Sweats: 1-Minimal Palms Moist


Orientation: 1-Uncertain about Date


Tacttile Disturbances: 1-Very Mild Itch/Numbness


Auditory Disturbances: 0-None


Visual Disturbances: 0-None


Headache: 0-None Present


CIWA-Ar Total Score: 13





[facility] COWS





- Scale


Resting Pulse: 0= [place] 80 or Below


Sweatin= Chills/Flushing


Restless Observation: 0= Sits Still


Pupil Size: 0= Normal to Room Light


Bone or Joint Aches: 1= Mild Discomfort


Runny Nose/ Eye Tearin= Nasal Congestion


GI Upset > 30mins: 2= Nausea/Diarrhea


Tremor Observation of Outstretched Hands: 2= Slight Tremor Visible


Yawning Observation: 2= >3x During Session


Anxiety or Irritability: 2=Irritable/Anxious


Goose Flesh Skin: 0=Smooth Skin


COWS Score: 11





[facility] Progress Note (SOAP)


Subjective: 





tremor


indigestion


body aches 


Objective: 





07/10/19 11:19


 Vital Signs











Temperature  98.3 F   07/10/19 09:21


 


Pulse Rate  74   07/10/19 09:21


 


Respiratory Rate  18   07/10/19 09:21


 


Blood Pressure  118/81   07/10/19 09:21


 


O2 Sat by Pulse Oximetry (%)      








 Laboratory Last Values











Urine Color  Yellow   19  23:36    


 


Urine Appearance  Clear   19  23:36    


 


Urine pH  6.5  (5.0-8.0)   19  23:36    


 


Ur Specific Gravity  1.020  (1.010-1.035)   19  23:36    


 


Urine Protein  Negative  (NEGATIVE)   19  23:36    


 


Urine Glucose (UA)  Negative  (NEGATIVE)   19  23:36    


 


Urine Ketones  Negative  (NEGATIVE)   19  23:36    


 


Urine Blood  Negative  (NEGATIVE)   19  23:36    


 


Urine Nitrite  Negative  (NEGATIVE)   19  23:36    


 


Urine Bilirubin  Negative  (NEGATIVE)   19  23:36    


 


Urine Urobilinogen  0.2 mg/dL (0.2-1.0)   19  23:36    


 


Ur Leukocyte Esterase  2+  (NEGATIVE)  H  19  23:36    


 


Urine WBC (Auto)  12 /hpf (0-5)   19  23:36    


 


Urine RBC (Auto)  1 /hpf (0-4)   19  23:36    


 


Urine Casts (Auto)  23 /lpf (0-8)   19  23:36    


 


U Epithel Cells (Auto)  2.0 /HPF (0-5/HPF)   19  23:36    


 


Urine Bacteria (Auto)  5.7 /hpf (NEGATIVE)   19  23:36    











07/10/19 11:19


see 


07/10/19 11:20


2019


Assessment: 





07/10/19 11:20


alcohol and opiate withdrawal sx


Plan: 





continue alcohol and opiate detox

## 2019-07-11 RX ADMIN — NICOTINE POLACRILEX PRN MG: 2 GUM, CHEWING ORAL at 23:37

## 2019-07-11 RX ADMIN — Medication SCH TAB: at 10:04

## 2019-07-11 RX ADMIN — Medication PRN MG: at 21:24

## 2019-07-11 RX ADMIN — NICOTINE SCH: 21 PATCH TRANSDERMAL at 10:04

## 2019-07-11 RX ADMIN — NICOTINE POLACRILEX PRN MG: 2 GUM, CHEWING ORAL at 16:50

## 2019-07-11 RX ADMIN — Medication SCH MG: at 21:23

## 2019-07-11 NOTE — PN
S CIWA





- CIWA Score


Nausea/Vomitin-Mild Nausea/No Vomiting


Muscle Tremors: 2


Anxiety: 2


Agitation: 2


Paroxysmal Sweats: 1-Minimal Palms Moist


Orientation: 1-Uncertain about Date


Tacttile Disturbances: 1-Very Mild Itch/Numbness


Auditory Disturbances: 0-None


Visual Disturbances: 0-None


Headache: 0-None Present


CIWA-Ar Total Score: 10





[facility] COWS





- Scale


Resting Pulse: 0= [place] 80 or Below


Sweatin= Chills/Flushing


Restless Observation: 0= Sits Still


Pupil Size: 0= Normal to Room Light


Bone or Joint Aches: 1= Mild Discomfort


Runny Nose/ Eye Tearin= Nasal Congestion


GI Upset > 30mins: 2= Nausea/Diarrhea


Tremor Observation of Outstretched Hands: 2= Slight Tremor Visible


Yawning Observation: 1= 1-2x During Session


Anxiety or Irritability: 2=Irritable/Anxious


Goose Flesh Skin: 0=Smooth Skin


COWS Score: 10





[facility] Progress Note (SOAP)


Subjective: 





28 years old male admitted on 19 for alcohol and opiate withdrawal sx


discuss medication assisted treatment program as well as community support 

approach





mild indigestion tolerate food and fluid well


abdomen soft around none tender + bowel sound


Objective: 





19 12:44


 Vital Signs











Temperature  96.8 F L  19 09:18


 


Pulse Rate  80   19 09:18


 


Respiratory Rate  18   19 09:18


 


Blood Pressure  115/82   19 09:18


 


O2 Sat by Pulse Oximetry (%)      








 Laboratory Last Values











Urine Color  Yellow   19  23:36    


 


Urine Appearance  Clear   19  23:36    


 


Urine pH  6.5  (5.0-8.0)   19  23:36    


 


Ur Specific Gravity  1.020  (1.010-1.035)   19  23:36    


 


Urine Protein  Negative  (NEGATIVE)   19  23:36    


 


Urine Glucose (UA)  Negative  (NEGATIVE)   19  23:36    


 


Urine Ketones  Negative  (NEGATIVE)   19  23:36    


 


Urine Blood  Negative  (NEGATIVE)   19  23:36    


 


Urine Nitrite  Negative  (NEGATIVE)   19  23:36    


 


Urine Bilirubin  Negative  (NEGATIVE)   19  23:36    


 


Urine Urobilinogen  0.2 mg/dL (0.2-1.0)   19  23:36    


 


Ur Leukocyte Esterase  2+  (NEGATIVE)  H  19  23:36    


 


Urine WBC (Auto)  12 /hpf (0-5)   19  23:36    


 


Urine RBC (Auto)  1 /hpf (0-4)   19  23:36    


 


Urine Casts (Auto)  23 /lpf (0-8)   19  23:36    


 


U Epithel Cells (Auto)  2.0 /HPF (0-5/HPF)   19  23:36    


 


Urine Bacteria (Auto)  5.7 /hpf (NEGATIVE)   19  23:36    








lab noted


19 12:44


lab see 2019 result


Assessment: 





19 12:46


alcohol and opiate withdrawal sx


Plan: 





continue alcohol and opiate detox

## 2019-07-12 VITALS — TEMPERATURE: 97.6 F | SYSTOLIC BLOOD PRESSURE: 112 MMHG | DIASTOLIC BLOOD PRESSURE: 79 MMHG | HEART RATE: 83 BPM

## 2019-07-12 RX ADMIN — Medication SCH: at 09:24

## 2019-07-12 RX ADMIN — NICOTINE SCH: 21 PATCH TRANSDERMAL at 09:23

## 2019-07-12 NOTE — DS
[facility] Detox Discharge Summary


Admission Date: 


07/08/19





Discharge Date: 07/12/19





- History


Present History: Alcohol Dependence, Cannabis Dependence, Opioid Dependence


Additional Comments: 





follow up with after care program as arrangement


Pertinent Past History: 





nicotine dependence





- Physical Exam Results


Vital Signs: 


 Vital Signs











Temperature  97.6 F   07/12/19 09:42


 


Pulse Rate  83   07/12/19 09:42


 


Respiratory Rate  18   07/12/19 09:42


 


Blood Pressure  112/79   07/12/19 09:42


 


O2 Sat by Pulse Oximetry (%)      











Pertinent Admission Physical Exam Findings: 





withdrawal signs and symptom


 Laboratory Last Values











Urine Color  Yellow   07/09/19  23:36    


 


Urine Appearance  Clear   07/09/19  23:36    


 


Urine pH  6.5  (5.0-8.0)   07/09/19  23:36    


 


Ur Specific Gravity  1.020  (1.010-1.035)   07/09/19  23:36    


 


Urine Protein  Negative  (NEGATIVE)   07/09/19  23:36    


 


Urine Glucose (UA)  Negative  (NEGATIVE)   07/09/19  23:36    


 


Urine Ketones  Negative  (NEGATIVE)   07/09/19  23:36    


 


Urine Blood  Negative  (NEGATIVE)   07/09/19  23:36    


 


Urine Nitrite  Negative  (NEGATIVE)   07/09/19  23:36    


 


Urine Bilirubin  Negative  (NEGATIVE)   07/09/19  23:36    


 


Urine Urobilinogen  0.2 mg/dL (0.2-1.0)   07/09/19  23:36    


 


Ur Leukocyte Esterase  2+  (NEGATIVE)  H  07/09/19  23:36    


 


Urine WBC (Auto)  12 /hpf (0-5)   07/09/19  23:36    


 


Urine RBC (Auto)  1 /hpf (0-4)   07/09/19  23:36    


 


Urine Casts (Auto)  23 /lpf (0-8)   07/09/19  23:36    


 


U Epithel Cells (Auto)  2.0 /HPF (0-5/HPF)   07/09/19  23:36    


 


Urine Bacteria (Auto)  5.7 /hpf (NEGATIVE)   07/09/19  23:36    














- Treatment


Hospital Course: Detox Protocol Followed, Detoxed Safely, Responded well, 

Discharged Condition Good, Rehab Referral Accepted


Patient has Accepted a Rehab Referral to: declined





- Medication


Discharge Medications: 


Ambulatory Orders





NK [No Known Home Medication]  07/08/19 











- Diagnosis


(1) Opioid dependence with withdrawal


Current Visit: Yes   Status: Acute   





(2) Alcohol dependence with uncomplicated withdrawal


Current Visit: Yes   Status: Acute   





(3) Cannabis dependence, uncomplicated


Current Visit: Yes   Status: Acute   





(4) Nicotine dependence


Current Visit: Yes   Status: Chronic   


Qualifiers: 


   Nicotine product type: cigarettes   Substance use status: in withdrawal   

Qualified Code(s): F17.213 - Nicotine dependence, cigarettes, with withdrawal   





- AMA


Did Patient Leave Against Medical Advice: No

## 2019-07-12 NOTE — PN
[facility] CIWA





- CIWA Score


Nausea/Vomitin-No Nausea/No Vomiting


Muscle Tremors: 1-None Visible, but Felt


Anxiety: 1-Mildly Anxious


Agitation: 1-Slight > Activity


Paroxysmal Sweats: No Perspiration


Orientation: 0-Oriented


Tacttile Disturbances: 0-None


Auditory Disturbances: 0-None


Visual Disturbances: 0-None


Headache: 1-Very Mild


CIWA-Ar Total Score: 4





[facility] COWS





- Scale


Resting Pulse: 1= [place] 


Sweatin= No chills or Flushing


Restless Observation: 0= Sits Still


Pupil Size: 0= Normal to Room Light


Bone or Joint Aches: 1= Mild Discomfort


Runny Nose/ Eye Tearin= None


GI Upset > 30mins: 0= None


Tremor Observation of Outstretched Hands: 1= Tremor Felt, Not Seen


Yawning Observation: 0= None


Anxiety or Irritability: 1=Feels Anxious/Irritable


Goose Flesh Skin: 0=Smooth Skin


COWS Score: 4





[facility] Progress Note (SOAP)


Subjective: 





alert,no complaint


Objective: 





19 12:58


 Vital Signs











Temperature  97.6 F   19 09:42


 


Pulse Rate  83   19 09:42


 


Respiratory Rate  18   19 09:42


 


Blood Pressure  112/79   19 09:42


 


O2 Sat by Pulse Oximetry (%)      











Assessment: 





19 12:59no withdrawal symptom


Plan: 





patient is stable for discharge today,follow up with after care program as 

arrangement

## 2019-10-12 ENCOUNTER — HOSPITAL ENCOUNTER (INPATIENT)
Dept: HOSPITAL 74 - YASAS | Age: 29
LOS: 6 days | Discharge: HOME | DRG: 773 | End: 2019-10-18
Attending: ALLERGY & IMMUNOLOGY | Admitting: ALLERGY & IMMUNOLOGY
Payer: COMMERCIAL

## 2019-10-12 VITALS — BODY MASS INDEX: 25.8 KG/M2

## 2019-10-12 DIAGNOSIS — F12.20: ICD-10-CM

## 2019-10-12 DIAGNOSIS — F11.23: ICD-10-CM

## 2019-10-12 DIAGNOSIS — F32.9: ICD-10-CM

## 2019-10-12 DIAGNOSIS — Z88.8: ICD-10-CM

## 2019-10-12 DIAGNOSIS — F14.20: ICD-10-CM

## 2019-10-12 DIAGNOSIS — F10.230: Primary | ICD-10-CM

## 2019-10-12 DIAGNOSIS — F17.210: ICD-10-CM

## 2019-10-13 PROCEDURE — HZ2ZZZZ DETOXIFICATION SERVICES FOR SUBSTANCE ABUSE TREATMENT: ICD-10-PCS | Performed by: ALLERGY & IMMUNOLOGY

## 2019-10-13 RX ADMIN — Medication SCH: at 10:32

## 2019-10-13 RX ADMIN — Medication PRN MG: at 23:54

## 2019-10-13 RX ADMIN — Medication SCH: at 23:16

## 2019-10-13 RX ADMIN — NICOTINE SCH: 21 PATCH TRANSDERMAL at 10:33

## 2019-10-13 RX ADMIN — Medication PRN MG: at 03:13

## 2019-10-13 NOTE — PN
[facility] Progress Note


Note: 





DNP-NP NOTE


Patient is stable, c/o mild headache, chills, tremor and anxiety. Will maintain 

medical stability. Continue detox protocol, encouraged PO water hydration.

## 2019-10-13 NOTE — HP
COWS





- Scale


Resting Pulse: 2= -120


Sweatin=Flushed/Facial Moisture


Restless Observation: 1= Difficult to Sit Still


Pupil Size: 0= Normal to Room Light


Bone or Joint Aches: 2= Severe Diffuse Aches


Runny Nose/ Eye Tearin= Runny Nose/Eyes


GI Upset > 30mins: 2= Nausea/Diarrhea (no diarrhea)


Tremor Observation: 2= Slight Tremor Visible


Yawning Observation: 0= None


Anxiety or Irritability: 2=Irritable/Anxious


Goose Flesh Skin: 0=Smooth Skin


COWS Score: 15





CIWA Score


Nausea/Vomitin-Mild Nausea/No Vomiting


Muscle Tremors: 2


Anxiety: 1-Mildly Anxious


Agitation: 0-Normal Activity


Paroxysmal Sweats: No Perspiration


Orientation: 0-Oriented


Tacttile Disturbances: 0-None


Auditory Disturbances: 0-None


Visual Disturbances: 0-None


Headache: 3-Moderate


CIWA-Ar Total Score: 7





- Admission Criteria


[facility]ASAS Guidelines: Admission for Medically Managed Detox: 


Requires at least one of the followin. CIWA greater than 12


2. Seizures within the past 24 hours


3. Delirium tremens within the past 24 hours


4. Hallucinations within the past 24 hours


5. Acute intervention needed for co  occurring medical disorder


6. Acute intervention needed for co  occurring psychiatric disorder


7. Severe withdrawal that cannot be handled at a lower level of care (continued


    vomiting, continued diarrhea, abnormal vital signs) requiring intravenous


    medication and/or fluids


8. Pregnancy








Admitting History and Physical





- Smoking History


Smoking history: Current every day smoker


Have you smoked in the past 12 months: Yes


Aproximately how many cigarettes per day: 20





- Alcohol/Substance Use


Hx Alcohol Use: Yes





Admission [facility]


Chief Complaint: 





Seeking admission to detox from heroine


Allergies/Adverse Reactions: 


 Allergies











Allergy/AdvReac Type Severity Reaction Status Date / Time


 


chlordiazepoxide Allergy Intermediate Hives Verified 10/12/19 23:53





[From Librium]     











History of Present Illness: 





28 years old male is seeking admission to detox from heroin and possibly 

alcohol. Patient reports that his last detox was in July at  this facility.  

Patient has been in multiple detox and reports insignificant period of 

sobriety. He has past medical history of depression and denies suicidal ideation

, IVDU and overdose at this time. Patient reports that he uses Alcohol 3-6 

times weekly and last use was on 10/11/2019. RITA is .000. Patient is being 

detoxed from Heroin only at this time





- Ebola screening


Have you traveled outside of the country in the last 21 days: No (N)


Have you had contact with anyone from an Ebola affected area: No


Do you have a fever: No





- Review of Systems


Constitutional: Loss of Appetite, Malaise, Changes in sleep


EENT: reports: No Symptoms Reported


Respiratory: reports: No Symptoms reported


Cardiac: reports: No Symptoms Reported


GI: reports: Constipated, Nausea, Poor Appetite, Poor Fluid Intake, Abdominal 

cramping


: reports: No Symptoms Reported


Musculoskeletal: reports: Back Pain, Muscle Pain


Integumentary: reports: Dryness, Flushing


Neuro: reports: Tremors


Hematology: reports: No Symptoms Reported


Psychiatric: reports: Mood/Affect Appropiate, Orientated x3


Other Systems: Reviewed and Negative





Patient History





- Patient Medical History


Hx Anemia: No


Hx Asthma: No


Hx Chronic Obstructive Pulmonary Disease (COPD): No


Hx Cancer: No


Hx Cardiac Disorders: No


Hx Congestive Heart Failure: No


Hx Hypertension: No


Hx Hypercholesterolemia: No


Hx Pacemaker: No


HX Cerebrovascular Accident: No


Hx Seizures: No


Hx Dementia: No


Hx Diabetes: No


Hx Gastrointestinal Disorders: No


Hx Liver Disease: No


Hx Genitourinary Disorders: No


Hx Sexually Transmitted Disorders: No


Hx Renal Disease (ESRD): No


Hx Thyroid Disease: No


Hx Human Immunodeficiency Virus (HIV): No ( NEGATIVE 2019)


Hx Hepatitis C: No ( )


Hx Depression: No


Hx Suicide Attempt: No


Hx Bipolar Disorder: No


Hx Schizophrenia: No





- Patient Surgical History


Past Surgical History: No


Hx Neurologic Surgery: No


Hx Cataract Extraction: No


Hx Cardiac Surgery: No


Hx Lung Surgery: No


Hx Abdominal Surgery: No


Hx Appendectomy: No


Hx Cholecystectomy: No


Hx Genitourinary Surgery: No


Hx Orthopedic Surgery: No


Anesthesia Reaction: No





- PPD History


Previous Implant?: Yes


Documented Results: Negative w/proof


Implanted On Prior R Admission?: Yes


Date: 07/10/19


Results: 0MM


PPD to be Administered?: No





- Reproductive History


Patient is a Female of Child Bearing Age (11 -55 yrs old): No (male)





- Smoking Cessation


Smoking history: Current every day smoker


Have you smoked in the past 12 months: Yes


Aproximately how many cigarettes per day: 20


Cigars Per Day: 0


Hx Chewing Tobacco Use: No


Initiated information on smoking cessation: Yes


'Breaking Loose' booklet given: 10/13/19





- Substance & Tx. History


Hx Alcohol Use: Yes


Hx Substance Use: Yes


Substance Use Type: Alcohol, Heroin, Marijuana


Hx Substance Use Treatment: Yes ([facility])





- Substances abused


  ** Heroin


Substance route: Inhalation


Frequency: Daily


Amount used: 10-15bags


Age of first use: 24


Date of last use: 10/12/19





  ** Alcohol


Other (specify): VODKA/RUM


Substance route: Oral


Frequency: 3-6 times per week


Amount used: 1 pint of rum


Age of first use: 18


Date of last use: 10/11/19





  ** Marijuana/Hashish


Substance route: Smoking


Frequency: 3-6 times per week


Amount used: 1 bag


Age of first use: 18


Date of last use: 10/11/19





Admission Physical Exam [facility]





- Vital Signs


Vital Signs: 


 Vital Signs - 24 hr











  10/12/19 10/13/19





  23:58 00:49


 


Temperature 98.6 F 98.6 F


 


Pulse Rate 103 H 103 H


 


Respiratory 14 14





Rate  


 


Blood Pressure 142/83 142/83














- Physical


General Appearance: Yes: Moderate Distress, Irritable


HEENTM: Yes: Within Normal Limits, Normal Voice, PEREZ


Respiratory: Yes: Lungs Clear, Normal Breath Sounds, No Respiratory Distress


Neck: Yes: Supple


Breast: Yes: Breast Exam Deferred


Cardiology: Yes: Tachycardia


Abdominal: Yes: Normal Bowel Sounds


Genitourinary: Yes: Within Normal Limits


Back: Yes: Normal Inspection


Musculoskeletal: Yes: Back pain, Muscle Pain


Extremities: Yes: Tremors


Neurological: Yes: Alert, Normal Mood/Affect


Integumentary: Yes: Warm


Lymphatic: Yes: Within Normal Limits





- Diagnostic


(1) Cannabis dependence, uncomplicated


Current Visit: Yes   Status: Chronic   





(2) Opioid dependence with withdrawal


Current Visit: Yes   Status: Acute   





(3) Depression


Current Visit: No   Status: Chronic   


Qualifiers: 


   Depression Type: unspecified   Qualified Code(s): F32.9 - Major depressive 

disorder, single episode, unspecified   





(4) Nicotine dependence


Current Visit: Yes   Status: Chronic   


Qualifiers: 


   Nicotine product type: cigarettes   Substance use status: in withdrawal   

Qualified Code(s): F17.213 - Nicotine dependence, cigarettes, with withdrawal   





Cleared for Admission [facility]





- Detox or Rehab


[facility] Level of Care: Medically Managed


Detox Regimen/Protocol: Methadone





Breathalyzer





- Breathalyzer


Breathalyzer: 0





Urine Drug Screen





- Test Device


Lot number: [id number]


Expiration date: 21





- Control


Is test valid?: Yes





- Results


Drug screen NEGATIVE: No


Urine drug screen results: THC-Marijuana, FEN-Fentanyl, MOP-Opiates





Inpatient Rehab Admission





- Rehab Decision to Admit


Inpatient rehab admission?: No

## 2019-10-14 RX ADMIN — Medication SCH MG: at 21:58

## 2019-10-14 RX ADMIN — Medication SCH TAB: at 10:53

## 2019-10-14 RX ADMIN — HYDROXYZINE PAMOATE PRN MG: 25 CAPSULE ORAL at 21:57

## 2019-10-14 RX ADMIN — Medication PRN MG: at 21:57

## 2019-10-14 RX ADMIN — NICOTINE SCH: 21 PATCH TRANSDERMAL at 10:45

## 2019-10-14 NOTE — PN
[facility] CIWA





- CIWA Score


Nausea/Vomitin-No Nausea/No Vomiting


Muscle Tremors: 2


Anxiety: 3


Agitation: 3


Paroxysmal Sweats: 3


Orientation: 0-Oriented


Tacttile Disturbances: 0-None


Auditory Disturbances: 0-None


Visual Disturbances: 0-None


Headache: 0-None Present


CIWA-Ar Total Score: 11





[facility] COWS





- Scale


Resting Pulse: 0= [place] 80 or Below


Sweatin= Chills/Flushing


Restless Observation: 1= Difficult to Sit Still


Pupil Size: 0= Normal to Room Light


Bone or Joint Aches: 2= Severe Diffuse Aches


Runny Nose/ Eye Tearin= Runny Nose/Eyes


GI Upset > 30mins: 2= Nausea/Diarrhea


Tremor Observation of Outstretched Hands: 2= Slight Tremor Visible


Yawning Observation: 2= >3x During Session


Anxiety or Irritability: 2=Irritable/Anxious


Goose Flesh Skin: 3=Piloerection


COWS Score: 17





[facility] Progress Note (SOAP)


Subjective: 





sweats


chills


shakes


interrupted sleep


body aches


agitation


Objective: 





10/14/19 12:47


 Vital Signs











Temperature  97.7 F   10/14/19 07:10


 


Pulse Rate  66   10/14/19 07:10


 


Respiratory Rate  18   10/14/19 07:10


 


Blood Pressure  91/60   10/14/19 07:10


 


O2 Sat by Pulse Oximetry (%)      








labs pending


aaox3


ambulating


no acute distress


Assessment: 





10/14/19 12:47


withdrawals sx


Plan: 





continue detox


increase fluids

## 2019-10-14 NOTE — EKG
Test Reason : 

Blood Pressure : ***/*** mmHG

Vent. Rate : 061 BPM     Atrial Rate : 061 BPM

   P-R Int : 176 ms          QRS Dur : 094 ms

    QT Int : 416 ms       P-R-T Axes : 048 067 046 degrees

   QTc Int : 418 ms

 

NORMAL SINUS RHYTHM

NORMAL ECG

WHEN COMPARED WITH ECG OF 25-APR-2019 21:13,

T WAVE AMPLITUDE HAS INCREASED IN ANTERIOR LEADS

Confirmed by ROGELIO CULLEN MD ([id number]) on 10/14/2019 12:57:53 PM

 

Referred By: Slade Donaldson           Confirmed By:ROGELIO CULLEN MD

## 2019-10-15 RX ADMIN — Medication SCH TAB: at 10:28

## 2019-10-15 RX ADMIN — HYDROXYZINE PAMOATE PRN MG: 25 CAPSULE ORAL at 20:06

## 2019-10-15 RX ADMIN — METHOCARBAMOL PRN MG: 500 TABLET ORAL at 14:11

## 2019-10-15 RX ADMIN — HYDROXYZINE PAMOATE PRN MG: 25 CAPSULE ORAL at 11:18

## 2019-10-15 RX ADMIN — METHOCARBAMOL PRN MG: 500 TABLET ORAL at 23:22

## 2019-10-15 RX ADMIN — NICOTINE SCH: 21 PATCH TRANSDERMAL at 10:28

## 2019-10-15 RX ADMIN — METHOCARBAMOL PRN MG: 500 TABLET ORAL at 04:24

## 2019-10-15 RX ADMIN — Medication SCH MG: at 21:47

## 2019-10-15 RX ADMIN — Medication PRN MG: at 21:47

## 2019-10-15 NOTE — PN
S CIWA





- CIWA Score


Nausea/Vomitin


Muscle Tremors: 1-None Visible, but Felt


Anxiety: 3


Agitation: 2


Paroxysmal Sweats: 2


Orientation: 0-Oriented


Tacttile Disturbances: 2-Mild Itch/Numbness/Burn


Auditory Disturbances: 0-None


Visual Disturbances: 0-None


Headache: 0-None Present


CIWA-Ar Total Score: 12





[facility] COWS





- Scale


Resting Pulse: 1= [place] 


Sweatin= Chills/Flushing


Restless Observation: 1= Difficult to Sit Still


Pupil Size: 1= Pupils >than Normal


Bone or Joint Aches: 2= Severe Diffuse Aches


Runny Nose/ Eye Tearin= Nasal Congestion


GI Upset > 30mins: 1= Stomach Cramp


Tremor Observation of Outstretched Hands: 1= Tremor Felt, Not Seen


Yawning Observation: 0= None


Anxiety or Irritability: 2=Irritable/Anxious


Goose Flesh Skin: 3=Piloerection


COWS Score: 14





[facility] Progress Note (SOAP)


Subjective: 





interrupted sleep, sweats, shakes aches, pains , restless


Objective: 





10/15/19 11:58


 Vital Signs











Temperature  97.7 F   10/15/19 09:16


 


Pulse Rate  93 H  10/15/19 09:16


 


Respiratory Rate  18   10/15/19 09:16


 


Blood Pressure  109/74   10/15/19 09:16


 


O2 Sat by Pulse Oximetry (%)      








labs pending 


10/15/19 11:59


pt restless but aox3 , lying in bed covered up with blanket ,responding 

appropriately 


Assessment: 





10/15/19 12:00


withdrawal sx's 





Plan: 





cont. detox 


increase fluids


f/up pending labs 


valium prn

## 2019-10-15 NOTE — CONSULT
[facility] Psychiatric Consult





- Data


Date of interview: 10/15/19


Admission source: Self-referred


Identifying data: Mr Arreola is a 28 years old single  male, working 

off the [employer] as an , living with his mother seeking detox treatment 

for alcohol, opioid and cannabis


Psychiatric History: Patient came in to the office for the interview and walked 

out as soon as writer started asking questions. He stood up and said:"I'm done" 

and walked out

## 2019-10-16 RX ADMIN — Medication SCH TAB: at 10:51

## 2019-10-16 RX ADMIN — Medication SCH: at 21:23

## 2019-10-16 RX ADMIN — Medication PRN MG: at 21:22

## 2019-10-16 RX ADMIN — METHOCARBAMOL PRN MG: 500 TABLET ORAL at 13:56

## 2019-10-16 RX ADMIN — NICOTINE SCH: 21 PATCH TRANSDERMAL at 10:51

## 2019-10-16 NOTE — PN
S CIWA





- CIWA Score


Nausea/Vomitin


Muscle Tremors: 2


Anxiety: 3


Agitation: 2


Paroxysmal Sweats: 2


Orientation: 0-Oriented


Tacttile Disturbances: 2-Mild Itch/Numbness/Burn


Auditory Disturbances: 0-None


Visual Disturbances: 0-None


Headache: 0-None Present


CIWA-Ar Total Score: 13





[facility] COWS





- Scale


Resting Pulse: 1= [place] 


Sweatin= Chills/Flushing


Restless Observation: 1= Difficult to Sit Still


Pupil Size: 0= Normal to Room Light


Bone or Joint Aches: 2= Severe Diffuse Aches


Runny Nose/ Eye Tearin= Runny Nose/Eyes


GI Upset > 30mins: 1= Stomach Cramp


Tremor Observation of Outstretched Hands: 1= Tremor Felt, Not Seen


Yawning Observation: 1= 1-2x During Session


Anxiety or Irritability: 2=Irritable/Anxious


Goose Flesh Skin: 0=Smooth Skin


COWS Score: 12





[facility] Progress Note (SOAP)


Subjective: 





c/o of chills, body aches, sweats 


Objective: 





10/16/19 13:06


 Vital Signs











Temperature  98.2 F   10/16/19 13:01


 


Pulse Rate  81   10/16/19 13:01


 


Respiratory Rate  18   10/16/19 13:01


 


Blood Pressure  117/85   10/16/19 13:01


 


O2 Sat by Pulse Oximetry (%)      








labs pending 


Assessment: 





10/16/19 13:59


Aox3, anxious 


EENT WNL


full ROM no gait disturbance ambulating in the unit 








withdrawal sx 





Plan: 





increase PO fluids 


continue detox 


continue to monitor

## 2019-10-17 RX ADMIN — Medication SCH TAB: at 10:56

## 2019-10-17 RX ADMIN — HYDROXYZINE PAMOATE PRN MG: 25 CAPSULE ORAL at 01:14

## 2019-10-17 RX ADMIN — Medication SCH: at 23:42

## 2019-10-17 RX ADMIN — HYDROXYZINE PAMOATE PRN MG: 25 CAPSULE ORAL at 18:08

## 2019-10-17 RX ADMIN — METHOCARBAMOL PRN MG: 500 TABLET ORAL at 18:08

## 2019-10-17 RX ADMIN — NICOTINE SCH: 21 PATCH TRANSDERMAL at 10:56

## 2019-10-17 RX ADMIN — METHOCARBAMOL PRN MG: 500 TABLET ORAL at 01:14

## 2019-10-18 VITALS — TEMPERATURE: 96.4 F | SYSTOLIC BLOOD PRESSURE: 100 MMHG | DIASTOLIC BLOOD PRESSURE: 63 MMHG | HEART RATE: 77 BPM

## 2019-10-18 RX ADMIN — METHOCARBAMOL PRN MG: 500 TABLET ORAL at 04:19

## 2019-10-18 NOTE — DS
[facility] Detox Discharge Summary


Admission Date: 


10/13/19





Discharge Date: 10/18/19





- History


Present History: Alcohol Dependence, Cannabis Dependence, Cocaine Dependence, 

Opioid Dependence





- Physical Exam Results


Vital Signs: 


 Vital Signs











Temperature  96.4 F L  10/18/19 06:30


 


Pulse Rate  77   10/18/19 06:30


 


Respiratory Rate  18   10/18/19 06:30


 


Blood Pressure  100/63   10/18/19 06:30


 


O2 Sat by Pulse Oximetry (%)      











Pertinent Admission Physical Exam Findings: 





pt arrived in withdrawals


 Vital Signs











Temperature  96.4 F L  10/18/19 06:30


 


Pulse Rate  77   10/18/19 06:30


 


Respiratory Rate  18   10/18/19 06:30


 


Blood Pressure  100/63   10/18/19 06:30


 


O2 Sat by Pulse Oximetry (%)      








today pt is aaox3


no acute distress


no s/s of withdrawals








- Treatment


Hospital Course: Detox Protocol Followed, Detoxed Safely, Responded well, 

Discharged Condition Good, Rehab Referral Accepted


Patient has Accepted a Rehab Referral to: pt declined rehab; referral provided





- Medication


Discharge Medications: 


Ambulatory Orders





NK [No Known Home Medication]  07/08/19 











- Diagnosis


(1) Opioid dependence with withdrawal


Current Visit: Yes   Status: Chronic   





(2) Cannabis dependence, uncomplicated


Current Visit: Yes   Status: Chronic   





(3) Nicotine dependence


Current Visit: Yes   Status: Chronic   


Qualifiers: 


   Nicotine product type: cigarettes   Substance use status: uncomplicated   

Qualified Code(s): F17.210 - Nicotine dependence, cigarettes, uncomplicated   





(4) Alcohol dependence with uncomplicated withdrawal


Current Visit: Yes   Status: Chronic   





(5) Cocaine dependence, uncomplicated


Current Visit: Yes   Status: Chronic   





(6) Insomnia


Current Visit: No   Status: Acute   





(7) Depression


Current Visit: No   Status: Chronic   


Qualifiers: 


   Depression Type: unspecified   Qualified Code(s): F32.9 - Major depressive 

disorder, single episode, unspecified   





(8) Depressed affect


Current Visit: No   Status: Suspected   





- AMA


Did Patient Leave Against Medical Advice: No

## 2019-12-13 ENCOUNTER — HOSPITAL ENCOUNTER (INPATIENT)
Dept: HOSPITAL 74 - YASAS | Age: 29
LOS: 3 days | Discharge: LEFT BEFORE DISCHARGE | DRG: 770 | End: 2019-12-16
Attending: ALLERGY & IMMUNOLOGY | Admitting: ALLERGY & IMMUNOLOGY
Payer: COMMERCIAL

## 2019-12-13 VITALS — BODY MASS INDEX: 27.8 KG/M2

## 2019-12-13 DIAGNOSIS — F10.230: ICD-10-CM

## 2019-12-13 DIAGNOSIS — F17.210: ICD-10-CM

## 2019-12-13 DIAGNOSIS — Z88.8: ICD-10-CM

## 2019-12-13 DIAGNOSIS — F11.23: Primary | ICD-10-CM

## 2019-12-13 PROCEDURE — HZ2ZZZZ DETOXIFICATION SERVICES FOR SUBSTANCE ABUSE TREATMENT: ICD-10-PCS | Performed by: ALLERGY & IMMUNOLOGY

## 2019-12-13 NOTE — HP
COWS





- Scale


Resting Pulse: 1= [place] 


Sweatin= Chills/Flushing


Restless Observation: 5= Unable to Sit Still


Pupil Size: 1= Pupils >than Normal


Bone or Joint Aches: 4=Acute Joint/Muscle Pain


Runny Nose/ Eye Tearin= Constantly Teary/Runny


GI Upset > 30mins: 1= Stomach Cramp


Tremor Observation: 0= None


Yawning Observation: 1= 1-2x During Session


Anxiety or Irritability: 2=Irritable/Anxious


Goose Flesh Skin: 3=Piloerection


COWS Score: 23





CIWA Score


Nausea/Vomitin (WITNESSED VOMITING)


Muscle Tremors: None


Anxiety: 4-Mod. Anxious/Guarded


Agitation: 4-Moderately Restless


Paroxysmal Sweats: 3


Orientation: 1-Uncertain about Date


Tacttile Disturbances: 0-None


Auditory Disturbances: 0-None


Visual Disturbances: 3-Moderate Sensitivity (to light)


Headache: 0-None Present


CIWA-Ar Total Score: 20





- Admission Criteria


OASAS Guidelines: Admission for Medically Managed Detox: 


Requires at least one of the followin. CIWA greater than 12


2. Seizures within the past 24 hours


3. Delirium tremens within the past 24 hours


4. Hallucinations within the past 24 hours


5. Acute intervention needed for co  occurring medical disorder


6. Acute intervention needed for co  occurring psychiatric disorder


7. Severe withdrawal that cannot be handled at a lower level of care (continued


    vomiting, continued diarrhea, abnormal vital signs) requiring intravenous


    medication and/or fluids


8. Pregnancy








Admitting History and Physical





- Smoking History


Smoking history: Current every day smoker


Have you smoked in the past 12 months: Yes


Aproximately how many cigarettes per day: 20





- Alcohol/Substance Use


Hx Alcohol Use: Yes





Admission [facility]


Chief Complaint: 





seeking alcohol/heroin detox


Allergies/Adverse Reactions: 


 Allergies











Allergy/AdvReac Type Severity Reaction Status Date / Time


 


chlordiazepoxide Allergy Mild Vomiting Verified 19 19:44





[From Librium]     











History of Present Illness: 





HERE FOR ALCOHOL AND HEROIN DETOX. CLIENT IS SELF REFERRED. KNOWN TO PROGRAM. 

LAST HERE 10/2019. CLIENT REPORTS RELAPSING SAME DAY AFTER DC. HE REPORTS DAILY 

USE OF HEROIN/ALCOHOL.  DENIES IVDU, DRUG OVERDOSE, BLACK OUTS, SZ. LAST USE 2 

AM THIS MORNING. PRESENTS WITH C/O WITHDRAWAL SX'S. DENIES ANY SIGNIFICANT 

PERIOD OF CLEAN TIME EXCEPT WHEN IN TXMENT. LIVES W/ MOTHER, UNEMPLOYED, DENIES 

LEGALS


Exam Limitations: No Limitations





- Ebola screening


Have you traveled outside of the country in the last 21 days: No (N)


Have you had contact with anyone from an Ebola affected area: No


Do you have a fever: No





- Review of Systems


Constitutional: Chills, Loss of Appetite, Malaise, Night Sweats, Changes in 

sleep


EENT: reports: Nose Congestion


Respiratory: reports: No Symptoms reported


Cardiac: reports: No Symptoms Reported


GI: reports: Nausea, Abdominal cramping


: reports: No Symptoms Reported


Musculoskeletal: reports: Back Pain


Integumentary: reports: Sweating


Neuro: reports: No Symptoms reported


Endocrine: reports: No Symptoms Reported


Hematology: reports: No Symptoms Reported


Psychiatric: reports: Orientated x3, Agitated (IRRITABLE), Anxious


Other Systems: Reviewed and Negative





Patient History





- Patient Medical History


Hx Anemia: No


Hx Asthma: No


Hx Chronic Obstructive Pulmonary Disease (COPD): No


Hx Cancer: No


Hx Cardiac Disorders: No


Hx Congestive Heart Failure: No


Hx Hypertension: No


Hx Hypercholesterolemia: No


Hx Pacemaker: No


HX Cerebrovascular Accident: No


Hx Seizures: No


Hx Dementia: No


Hx Diabetes: No


Hx Gastrointestinal Disorders: No


Hx Liver Disease: No


Hx Genitourinary Disorders: No


Hx Sexually Transmitted Disorders: No


Hx Renal Disease (ESRD): No


Hx Thyroid Disease: No


Hx Human Immunodeficiency Virus (HIV): No


Hx Hepatitis C: No ( )


Hx Depression: No


Hx Suicide Attempt: No


Hx Bipolar Disorder: No


Hx Schizophrenia: No


Other Medical History: DENIES





- Patient Surgical History


Past Surgical History: No


Hx Neurologic Surgery: No


Hx Cataract Extraction: No


Hx Cardiac Surgery: No


Hx Lung Surgery: No


Hx Breast Surgery: No


Hx Breast Biopsy: No


Hx Abdominal Surgery: No


Hx Appendectomy: No


Hx Cholecystectomy: No


Hx Genitourinary Surgery: No


Hx  Section: No


Hx Orthopedic Surgery: No


Anesthesia Reaction: No





- PPD History


Previous Implant?: Yes


Documented Results: Negative w/proof


Implanted On Prior R Admission?: Yes


Date: 07/10/19


Results: 0MM


PPD to be Administered?: No





- Smoking Cessation


Smoking history: Current every day smoker


Have you smoked in the past 12 months: Yes


Aproximately how many cigarettes per day: 20


Cigars Per Day: 0


Hx Chewing Tobacco Use: No


Initiated information on smoking cessation: Yes


'Breaking Loose' booklet given: 19





- Substance & Tx. History


Hx Alcohol Use: Yes


Hx Substance Use: Yes


Substance Use Type: Alcohol, Heroin


Hx Substance Use Treatment: Yes ([facility])





- Substances abused


  ** Heroin


Substance route: Inhalation


Frequency: Daily


Amount used: 10-15bags


Age of first use: 24


Date of last use: 19





  ** Alcohol


Other (specify): VODKA/RUM


Substance route: Oral


Frequency: 3-6 times per week


Amount used: 1 pint of rum/vodka/6 packs


Age of first use: 18


Date of last use: 19





  ** Marijuana/Hashish


Substance route: Smoking


Frequency: 1-2 times per week


Amount used: 1 bag


Age of first use: 18


Date of last use: 19





Admission Physical Exam [facility]





- Vital Signs


Vital Signs: 


 Vital Signs - 24 hr











  19





  19:43


 


Temperature 97.8 F


 


Pulse Rate 88


 


Respiratory 18





Rate 


 


Blood Pressure 113/75














- Physical


General Appearance: Yes: Moderate Distress, Tremorous, Anxious


HEENTM: Yes: EOMI, Normocephalic, Normal Voice, PEREZ, Pharynx Normal


Respiratory: Yes: Chest Non-Tender, Lungs Clear, Normal Breath Sounds, No 

Respiratory Distress, No Accessory Muscle Use


Neck: Yes: No masses,lesions,Nodules, Supple, Trachea in good position


Breast: Yes: Breasts Symetrical


Cardiology: Yes: Regular Rhythm, Regular Rate, S1, S2


Abdominal: Yes: Non Tender, Soft, Increased Bowel Sounds


Genitourinary: Yes: Within Normal Limits


Back: Yes: Normal Inspection


Musculoskeletal: Yes: full range of Motion, Gait Steady


Extremities: Yes: Normal Capillary Refill, Normal Range of Motion, Non-Tender


Neurological: Yes: Fully Oriented, Alert, Motor Strength 5/5, Depressed Affect


Integumentary: Yes: Dry, Warm


Lymphatic: Yes: Within Normal Limits





- Diagnostic


(1) Alcohol dependence with uncomplicated withdrawal


Current Visit: No   Status: Chronic   





(2) Nicotine dependence


Current Visit: No   Status: Chronic   


Qualifiers: 


   Nicotine product type: cigarettes   Substance use status: uncomplicated   

Qualified Code(s): F17.210 - Nicotine dependence, cigarettes, uncomplicated   





(3) Opioid dependence with withdrawal


Current Visit: No   Status: Chronic   





(4) Depressed affect


Current Visit: No   Status: Suspected   Comment: declines psych consult   





Cleared for Admission [facility]





- Detox or Rehab


[facility] Level of Care: Medically Managed


Detox Regimen/Protocol: Methadone/Valium


Claeared for Rehab Admission: No





Breathalyzer





- Breathalyzer


Breathalyzer: 0





Urine Drug Screen





- Test Device


Lot number: [id number]


Expiration date: 21





- Control


Is test valid?: Yes





- Results


Drug screen NEGATIVE: Yes


Urine drug screen results: FEN-Fentanyl





Inpatient Rehab Admission





- Rehab Decision to Admit


Inpatient rehab admission?: No

## 2019-12-14 RX ADMIN — Medication SCH MG: at 22:32

## 2019-12-14 RX ADMIN — NICOTINE SCH: 21 PATCH TRANSDERMAL at 11:14

## 2019-12-14 RX ADMIN — Medication SCH TAB: at 11:19

## 2019-12-14 NOTE — CONSULT
[facility] Psychiatric Consult





- Data


Date of interview: 12/14/19


Admission source: [facility]


Identifying data: Patient is approached at bedside for psychiatric interview. 

Mr Arreola refuses. Nursing staff is made aware.

## 2019-12-14 NOTE — PN
[facility] CIWA





- CIWA Score


Nausea/Vomitin-Mild Nausea/No Vomiting


Muscle Tremors: 2


Anxiety: 2


Agitation: 2


Paroxysmal Sweats: 2


Orientation: 0-Oriented


Tacttile Disturbances: 0-None


Auditory Disturbances: 0-None


Visual Disturbances: 0-None


Headache: 2-Mild


CIWA-Ar Total Score: 11





[facility] COWS





- Scale


Resting Pulse: 1= [place] 


Sweatin= Chills/Flushing


Restless Observation: 1= Difficult to Sit Still


Pupil Size: 1= Pupils >than Normal


Bone or Joint Aches: 1= Mild Discomfort


Runny Nose/ Eye Tearin= Nasal Congestion


GI Upset > 30mins: 1= Stomach Cramp


Tremor Observation of Outstretched Hands: 1= Tremor Felt, Not Seen


Yawning Observation: 1= 1-2x During Session


Anxiety or Irritability: 1=Feels Anxious/Irritable


Goose Flesh Skin: 0=Smooth Skin


COWS Score: 10





[facility] Progress Note (SOAP)


Subjective: 





pt admitted last fei for alcohol and heroin detox. walking around no complaints

, doing well with the detox protocols





O:


 Vital Signs - 24 hr











  19





  19:43 00:30 03:30


 


Temperature 97.8 F  


 


Pulse Rate 88  


 


Respiratory 18 18 18





Rate   


 


Blood Pressure 113/75  














  19





  05:50 06:25


 


Temperature 97.9 F 


 


Pulse Rate 71 71


 


Respiratory 18 18





Rate  


 


Blood Pressure 125/78 














a/p: alcohol and heroin Use disorders- continue detox protocols.


labs pending

## 2019-12-15 RX ADMIN — NICOTINE SCH: 21 PATCH TRANSDERMAL at 09:47

## 2019-12-15 RX ADMIN — Medication SCH MG: at 21:39

## 2019-12-15 RX ADMIN — Medication SCH TAB: at 09:47

## 2019-12-15 NOTE — PN
[facility] CIWA





- CIWA Score


Nausea/Vomitin-Mild Nausea/No Vomiting


Muscle Tremors: 2


Anxiety: 3


Agitation: 2


Paroxysmal Sweats: 2


Orientation: 0-Oriented


Tacttile Disturbances: 0-None


Auditory Disturbances: 0-None


Visual Disturbances: 0-None


Headache: 0-None Present


CIWA-Ar Total Score: 10





BHS COWS





- Scale


Resting Pulse: 0= [place] 80 or Below


Sweatin= Chills/Flushing


Restless Observation: 3= Extraneous Movement


Pupil Size: 0= Normal to Room Light


Bone or Joint Aches: 1= Mild Discomfort


Runny Nose/ Eye Tearin= None


GI Upset > 30mins: 1= Stomach Cramp


Tremor Observation of Outstretched Hands: 2= Slight Tremor Visible


Yawning Observation: 0= None


Anxiety or Irritability: 2=Irritable/Anxious


Goose Flesh Skin: 0=Smooth Skin


COWS Score: 10





BHS Progress Note (SOAP)


Subjective: 





Patient asleep but aroused easily, refused to speak with writer


Objective: 





12/15/19 12:42


 Last Vital Signs











Temp Pulse Resp BP Pulse Ox


 


 98.1 F   56 L  18   111/70    


 


 19 21:51  12/15/19 06:00  12/15/19 06:00  12/15/19 06:00   








No admission lab result available (patient refused admission labs), lab results 

from March and 2019 reviewed


Assessment: 





12/15/19 12:43


Withdrawal sxs


Plan: 





Continue detox


Encouraged PO water intake





Admission labs reordered in AM: CBC, CMP, RPR

## 2019-12-16 VITALS — DIASTOLIC BLOOD PRESSURE: 68 MMHG | TEMPERATURE: 97.5 F | HEART RATE: 66 BPM | SYSTOLIC BLOOD PRESSURE: 106 MMHG

## 2019-12-16 NOTE — DS
[facility] Detox Discharge Summary


Admission Date: 


12/13/19





Discharge Date: 12/16/19





- History


Present History: Alcohol Dependence, Opioid Dependence


Additional Comments: 





patient signed release ama,advise to call 911 if not feeling well


Pertinent Past History: 





nicotine dependence





- Physical Exam Results


Vital Signs: 


 Vital Signs











Temperature  98.2 F   12/15/19 21:09


 


Pulse Rate  73   12/15/19 21:09


 


Respiratory Rate  18   12/16/19 03:30


 


Blood Pressure  107/66   12/15/19 21:09


 


O2 Sat by Pulse Oximetry (%)      











Pertinent Admission Physical Exam Findings: 





withdrawal signs and symptom


 Vital Signs











Temperature  98.2 F   12/15/19 21:09


 


Pulse Rate  73   12/15/19 21:09


 


Respiratory Rate  18   12/16/19 03:30


 


Blood Pressure  107/66   12/15/19 21:09


 


O2 Sat by Pulse Oximetry (%)      














- Medication


Discharge Medications: 


Ambulatory Orders





NK [No Known Home Medication]  07/08/19 











- Diagnosis


(1) Alcohol dependence with uncomplicated withdrawal


Current Visit: No   Status: Chronic   





(2) Nicotine dependence


Current Visit: No   Status: Chronic   


Qualifiers: 


   Nicotine product type: cigarettes   Substance use status: uncomplicated   

Qualified Code(s): F17.210 - Nicotine dependence, cigarettes, uncomplicated   





(3) Opioid dependence with withdrawal


Current Visit: No   Status: Chronic   





- AMA


Did Patient Leave Against Medical Advice: Yes

## 2019-12-16 NOTE — PN
[facility] CIWA





- CIWA Score


Nausea/Vomitin-Mild Nausea/No Vomiting


Muscle Tremors: 2


Anxiety: 2


Agitation: 2


Paroxysmal Sweats: No Perspiration


Orientation: 0-Oriented


Tacttile Disturbances: 1-Very Mild Itch/Numbness


Auditory Disturbances: 0-None


Visual Disturbances: 0-None


Headache: 1-Very Mild


CIWA-Ar Total Score: 9





BHS COWS





- Scale


Resting Pulse: 0= [place] 80 or Below


Sweatin= No chills or Flushing


Restless Observation: 1= Difficult to Sit Still


Pupil Size: 1= Pupils >than Normal


Bone or Joint Aches: 1= Mild Discomfort


Runny Nose/ Eye Tearin= Nasal Congestion


GI Upset > 30mins: 1= Stomach Cramp


Tremor Observation of Outstretched Hands: 1= Tremor Felt, Not Seen


Yawning Observation: 1= 1-2x During Session


Anxiety or Irritability: 1=Feels Anxious/Irritable


Goose Flesh Skin: 0=Smooth Skin


COWS Score: 8





BHS Progress Note (SOAP)


Subjective: 





alepainin the bodyrt,irritable,anxious,interrupted sleep,


Objective: 





19 09:09


 Vital Signs











Temperature  98.2 F   12/15/19 21:09


 


Pulse Rate  73   12/15/19 21:09


 


Respiratory Rate  18   19 03:30


 


Blood Pressure  107/66   12/15/19 21:09


 


O2 Sat by Pulse Oximetry (%)      








ekg nsr,rate 73


19 09:10


refused blood tests


Assessment: 





19 09:10


withdrawal symptom


Plan: 





continue detox,methadone and valium regimen

## 2019-12-16 NOTE — PN
BHS Progress Note


Note: 





patient did not want to complete treatment,all attempts to convince patient to 

stay with no avail,


signed release ama,advise to call 911 if not feeling well

## 2019-12-17 NOTE — EKG
Test Reason : 

Blood Pressure : ***/*** mmHG

Vent. Rate : 073 BPM     Atrial Rate : 073 BPM

   P-R Int : 168 ms          QRS Dur : 092 ms

    QT Int : 400 ms       P-R-T Axes : 061 068 026 degrees

   QTc Int : 440 ms

 

NORMAL SINUS RHYTHM

NORMAL ECG

WHEN COMPARED WITH ECG OF 14-OCT-2019 07:51,

NO SIGNIFICANT CHANGE WAS FOUND

Confirmed by Sean Wiggins MD ([id number]) on 12/17/2019 11:30:09 AM

 

Referred By: Slade Donaldson           Confirmed By:Sean Wiggins MD

## 2020-01-07 ENCOUNTER — HOSPITAL ENCOUNTER (INPATIENT)
Dept: HOSPITAL 74 - YASAS | Age: 30
LOS: 5 days | Discharge: OTHER INSTITUTION | DRG: 773 | End: 2020-01-12
Attending: ALLERGY & IMMUNOLOGY | Admitting: ALLERGY & IMMUNOLOGY
Payer: COMMERCIAL

## 2020-01-07 VITALS — BODY MASS INDEX: 28.1 KG/M2

## 2020-01-07 DIAGNOSIS — F12.20: ICD-10-CM

## 2020-01-07 DIAGNOSIS — F32.9: ICD-10-CM

## 2020-01-07 DIAGNOSIS — F17.213: ICD-10-CM

## 2020-01-07 DIAGNOSIS — G47.00: ICD-10-CM

## 2020-01-07 DIAGNOSIS — Z88.8: ICD-10-CM

## 2020-01-07 DIAGNOSIS — F10.230: Primary | ICD-10-CM

## 2020-01-07 DIAGNOSIS — F11.23: ICD-10-CM

## 2020-01-07 DIAGNOSIS — F14.20: ICD-10-CM

## 2020-01-07 LAB
ALBUMIN SERPL-MCNC: 3.8 G/DL (ref 3.4–5)
ALP SERPL-CCNC: 82 U/L (ref 45–117)
ALT SERPL-CCNC: 40 U/L (ref 13–61)
ANION GAP SERPL CALC-SCNC: 6 MMOL/L (ref 8–16)
AST SERPL-CCNC: 25 U/L (ref 15–37)
BILIRUB SERPL-MCNC: 0.3 MG/DL (ref 0.2–1)
BUN SERPL-MCNC: 15.8 MG/DL (ref 7–18)
CALCIUM SERPL-MCNC: 8.7 MG/DL (ref 8.5–10.1)
CHLORIDE SERPL-SCNC: 102 MMOL/L (ref 98–107)
CO2 SERPL-SCNC: 27 MMOL/L (ref 21–32)
CREAT SERPL-MCNC: 0.8 MG/DL (ref 0.55–1.3)
DEPRECATED RDW RBC AUTO: 13.6 % (ref 11.9–15.9)
GLUCOSE SERPL-MCNC: 105 MG/DL (ref 74–106)
HCT VFR BLD CALC: 42.7 % (ref 35.4–49)
HGB BLD-MCNC: 14.1 GM/DL (ref 11.7–16.9)
MCH RBC QN AUTO: 28.9 PG (ref 25.7–33.7)
MCHC RBC AUTO-ENTMCNC: 32.9 G/DL (ref 32–35.9)
MCV RBC: 87.8 FL (ref 80–96)
PLATELET # BLD AUTO: 193 K/MM3 (ref 134–434)
PMV BLD: 10.8 FL (ref 7.5–11.1)
POTASSIUM SERPLBLD-SCNC: 3.9 MMOL/L (ref 3.5–5.1)
PROT SERPL-MCNC: 7.3 G/DL (ref 6.4–8.2)
RBC # BLD AUTO: 4.86 M/MM3 (ref 4–5.6)
SODIUM SERPL-SCNC: 136 MMOL/L (ref 136–145)
WBC # BLD AUTO: 9.8 K/MM3 (ref 4–10)

## 2020-01-07 PROCEDURE — HZ2ZZZZ DETOXIFICATION SERVICES FOR SUBSTANCE ABUSE TREATMENT: ICD-10-PCS | Performed by: ALLERGY & IMMUNOLOGY

## 2020-01-07 RX ADMIN — Medication SCH: at 22:44

## 2020-01-07 RX ADMIN — HYDROXYZINE PAMOATE PRN MG: 25 CAPSULE ORAL at 12:37

## 2020-01-07 NOTE — PN
BHS Progress Note


Note: 





patient is allergic to librium,will change from methadone and librium to 

methadone and valium

## 2020-01-07 NOTE — HP
COWS





- Scale


Resting Pulse: 0= [place] 80 or Below


Sweatin= No chills or Flushing


Restless Observation: 1= Difficult to Sit Still


Pupil Size: 0= Normal to Room Light


Bone or Joint Aches: 1= Mild Discomfort


Runny Nose/ Eye Tearin= Runny Nose/Eyes


GI Upset > 30mins: 1= Stomach Cramp


Tremor Observation: 1= Tremor Felt, Not Seen


Yawning Observation: 2= >3x During Session


Anxiety or Irritability: 2=Irritable/Anxious


Goose Flesh Skin: 3=Piloerection


COWS Score: 13





CIWA Score


Nausea/Vomitin-No Nausea/No Vomiting


Muscle Tremors: 1-None Visible, but Felt


Anxiety: 2


Agitation: 2


Paroxysmal Sweats: 3


Orientation: 0-Oriented


Tacttile Disturbances: 0-None


Auditory Disturbances: 0-None


Visual Disturbances: 1-Very Mild Sensitivity


Headache: 3-Moderate


CIWA-Ar Total Score: 12





- Admission Criteria


[facility] Guidelines: Admission for Medically Managed Detox: 


Requires at least one of the followin. CIWA greater than 12


2. Seizures within the past 24 hours


3. Delirium tremens within the past 24 hours


4. Hallucinations within the past 24 hours


5. Acute intervention needed for co  occurring medical disorder


6. Acute intervention needed for co  occurring psychiatric disorder


7. Severe withdrawal that cannot be handled at a lower level of care (continued


    vomiting, continued diarrhea, abnormal vital signs) requiring intravenous


    medication and/or fluids


8. Pregnancy








Admitting History and Physical





- Admission


Chief Complaint: " I am serious about completing detox and rehab."


History of Present Illness: 


29 year old male with opioid dependence with withdrawals and alcohol dependence.


He is using 1 1.5 bundles of heroin daily, intranasally.  He has not yet 

overdose.  He has no narcan kit. 


He is drinking 1 pint of rum daily, last drink yesterday. He denies withdrawal 

seizures.  However, he's had episodes of near blackouts.  He drinks usually 

secondarily to heroin.


He is requesting detox from heroin and alcohol. He is self-referred and mom 

drove him to [facility]. Known to the program and was in detox but signed out 

early due to the death of his cousin.  However, she slept in the streets last 

night and mother picked him up to bring him to detox because she gave him an 

ultimatum to clean up or not come back home. 


He smokes 1/2 ppd - 1ppd daily since the age of 19 years old.


Marijuana use is less frequent  about once every 3 weeks.





PMH: None


Psurg:  None


Psych:  None





History Source: Patient


Limitations to Obtaining History: No Limitations





- Past Surgical History


Past Surgical History: Yes: None





- Advance Directives


Advance Directives: No: Living Will, Health Care Proxy, DNR





- Smoking History


Smoking history: Current every day smoker


Have you smoked in the past 12 months: Yes


Aproximately how many cigarettes per day: 20





- Alcohol/Substance Use


Hx Alcohol Use: Yes


History of Substance Use: reports: Heroin





- Social History


Usual Living Arrangement: Yes: Alone


Do you think of yourself as: Straight/Heterosexual


ADL: Independent


Occupation: unemployed


History of Recent Travel: Yes





Admission [facility]


Allergies/Adverse Reactions: 


 Allergies











Allergy/AdvReac Type Severity Reaction Status Date / Time


 


chlordiazepoxide Allergy Mild Vomiting Verified 20 09:11





[From Librium]     











Exam Limitations: No Limitations





- Ebola screening


Have you traveled outside of the country in the last 21 days: No


Have you had contact with anyone from an Ebola affected area: No


Have you been sick,other than usual withdrawal symptoms: No


Do you have a fever: No





- Review of Systems


Constitutional: Diaphoresis, Night Sweats, Unintentional Wgt. Loss


EENT: reports: No Symptoms Reported


Respiratory: reports: No Symptoms reported


Cardiac: reports: No Symptoms Reported


GI: reports: No Symptoms Reported


: reports: No Symptoms Reported


Musculoskeletal: reports: No Symptoms Reported


Integumentary: reports: No Symptoms Reported


Neuro: reports: No Symptoms reported


Endocrine: reports: No Symptoms Reported


Hematology: reports: No Symptoms Reported


Psychiatric: reports: Judgement Intact, Mood/Affect Appropiate, Orientated x3, 

Anxious, Depressed


Other Systems: Reviewed and Negative





Patient History





- Patient Medical History


Hx Anemia: No


Hx Asthma: No


Hx Chronic Obstructive Pulmonary Disease (COPD): No


Hx Cancer: No


Hx Cardiac Disorders: No


Hx Congestive Heart Failure: No


Hx Hypertension: No


Hx Hypercholesterolemia: No


Hx Pacemaker: No


HX Cerebrovascular Accident: No


Hx Seizures: No


Hx Dementia: No


Hx Diabetes: No


Hx Gastrointestinal Disorders: No


Hx Liver Disease: No


Hx Genitourinary Disorders: No


Hx Sexually Transmitted Disorders: No


Hx Renal Disease (ESRD): No


Hx Thyroid Disease: No


Hx Human Immunodeficiency Virus (HIV): No


Hx Hepatitis C: No ( )


Hx Depression: No


Hx Suicide Attempt: No


Hx Bipolar Disorder: No


Hx Schizophrenia: No





- Patient Surgical History


Past Surgical History: No


Hx Neurologic Surgery: No


Hx Cataract Extraction: No


Hx Cardiac Surgery: No


Hx Lung Surgery: No


Hx Breast Surgery: No


Hx Breast Biopsy: No


Hx Abdominal Surgery: No


Hx Appendectomy: No


Hx Cholecystectomy: No


Hx Genitourinary Surgery: No


Hx  Section: No


Hx Orthopedic Surgery: No


Anesthesia Reaction: No





- PPD History


Previous Implant?: Yes


Documented Results: Negative w/proof


Implanted On Prior [facility] Admission?: Yes


Date: 07/10/19


Results: 0MM


PPD to be Administered?: No





- Smoking Cessation


Smoking history: Current every day smoker


Have you smoked in the past 12 months: Yes


Aproximately how many cigarettes per day: 20


Cigars Per Day: 0


Hx Chewing Tobacco Use: No


Initiated information on smoking cessation: Yes


'Breaking Loose' booklet given: 20





- Substances abused


  ** Heroin


Substance route: Inhalation


Frequency: Daily


Amount used: 10-15bags


Age of first use: 26


Date of last use: 20





  ** Alcohol


Other (specify): VODKA/RUM


Substance route: Oral


Frequency: Daily


Amount used: 1 pint of rum/vodka/6 packs


Age of first use: 18


Date of last use: 20





  ** Marijuana/Hashish


Substance route: Smoking


Frequency: 1-2 times per week


Amount used: 1blunt


Age of first use: 18


Date of last use: 19





Admission Physical Exam [facility]





- Vital Signs


Vital Signs: 


 Vital Signs - 24 hr











  20





  09:05


 


Temperature 97.8 F


 


Pulse Rate 100 H


 


Respiratory 17





Rate 


 


Blood Pressure 124/84














- Physical


General Appearance: Yes: No Apparent Distress, Nourished, Appropriately Dressed


HEENTM: Yes: EOMI, Hearing grossly Normal, Normal ENT Inspection, Normocephalic

, Normal Voice, PEREZ, Pharynx Normal, Tm's normal


Respiratory: Yes: Chest Non-Tender, Lungs Clear, Normal Breath Sounds, No 

Respiratory Distress, No Accessory Muscle Use


Neck: Yes: No masses,lesions,Nodules, Supple, Trachea in good position


Breast: Yes: Within Normal Limits


Cardiology: Yes: Regular Rhythm, Regular Rate, S1, S2


Abdominal: Yes: Normal Bowel Sounds, Non Tender, Flat, Soft


Genitourinary: Yes: Within Normal Limits


Back: Yes: Normal Inspection


Musculoskeletal: Yes: full range of Motion, Gait Steady, Pelvis Stable


Extremities: Yes: Normal Capillary Refill, Normal Inspection, Normal Range of 

Motion, Non-Tender


Neurological: Yes: CNs II-XII NML intact, Fully Oriented, Alert, Motor Strength 

5/5, Normal Mood/Affect, Normal Response


Integumentary: Yes: Normal Color, Warm


Lymphatic: Yes: Within Normal Limits





- Diagnostic


(1) Insomnia


Status: Acute   





(2) Alcohol dependence with uncomplicated withdrawal


Status: Acute   





(3) Cannabis dependence, uncomplicated


Status: Chronic   





(4) Cocaine dependence, uncomplicated


Status: Chronic   





(5) Depression


Status: Chronic   


Qualifiers: 


   Depression Type: unspecified   Qualified Code(s): F32.9 - Major depressive 

disorder, single episode, unspecified   





(6) Nicotine dependence


Status: Acute   


Qualifiers: 


   Nicotine product type: cigarettes   Substance use status: in withdrawal   

Qualified Code(s): F17.213 - Nicotine dependence, cigarettes, with withdrawal   





(7) Opioid dependence with withdrawal


Status: Acute   





Cleared for Admission [facility]





- Detox or Rehab


[facility] Level of Care: Medically Managed


Detox Regimen/Protocol: Methadone/Librium


Claeared for Rehab Admission: No





Screened but not Admitted





- Documentation of Visit


Screened but not Admitted: No





Breathalyzer





- Breathalyzer


Breathalyzer: 0





Urine Drug Screen





- Test Device


Lot number: [id number]


Expiration date: 21





- Control


Is test valid?: Yes





- Results


Drug screen NEGATIVE: Yes


Urine drug screen results: FEN-Fentanyl, MOP-Opiates





Inpatient Rehab Admission





- Rehab Decision to Admit


Inpatient rehab admission?: No

## 2020-01-08 RX ADMIN — NICOTINE SCH: 14 PATCH, EXTENDED RELEASE TRANSDERMAL at 10:21

## 2020-01-08 RX ADMIN — HYDROXYZINE PAMOATE PRN MG: 25 CAPSULE ORAL at 18:05

## 2020-01-08 RX ADMIN — Medication SCH MG: at 22:56

## 2020-01-08 RX ADMIN — Medication SCH TAB: at 10:20

## 2020-01-08 NOTE — PN
S CIWA





- CIWA Score


Nausea/Vomitin-Mild Nausea/No Vomiting


Muscle Tremors: 2


Anxiety: 3


Agitation: 1-Slight > Activity


Paroxysmal Sweats: 2


Orientation: 0-Oriented


Tacttile Disturbances: 1-Very Mild Itch/Numbness


Auditory Disturbances: 0-None


Visual Disturbances: 0-None


Headache: 1-Very Mild


CIWA-Ar Total Score: 11





BHS COWS





- Scale


Resting Pulse: 0= [place] 80 or Below


Sweatin= Chills/Flushing


Restless Observation: 0= Sits Still


Pupil Size: 1= Pupils >than Normal


Bone or Joint Aches: 1= Mild Discomfort


Runny Nose/ Eye Tearin= Nasal Congestion


GI Upset > 30mins: 1= Stomach Cramp


Tremor Observation of Outstretched Hands: 2= Slight Tremor Visible


Yawning Observation: 0= None


Anxiety or Irritability: 1=Feels Anxious/Irritable


Goose Flesh Skin: 3=Piloerection


COWS Score: 11





BHS Progress Note (SOAP)


Subjective: 





29 years old male admitted on 20 for alcohol opiate withdrawal sx 

management treating with valium and methadone detox regimens 


feeling tired resting in bed limited conversation with staff 


encourage oral fluid 


Objective: 





20 10:40


 Vital Signs











Temperature  97.8 F   20 09:14


 


Pulse Rate  73   20 09:14


 


Respiratory Rate  18   20 09:14


 


Blood Pressure  102/66   20 09:14


 


O2 Sat by Pulse Oximetry (%)      








 Laboratory Last Values











WBC  9.8 K/mm3 (4.0-10.0)   20  11:55    


 


RBC  4.86 M/mm3 (4.00-5.60)   20  11:55    


 


Hgb  14.1 GM/dL (11.7-16.9)   20  11:55    


 


Hct  42.7 % (35.4-49)   20  11:55    


 


MCV  87.8 fl (80-96)   20  11:55    


 


MCH  28.9 pg (25.7-33.7)   20  11:55    


 


MCHC  32.9 g/dl (32.0-35.9)   20  11:55    


 


RDW  13.6 % (11.9-15.9)   20  11:55    


 


Plt Count  193 K/MM3 (134-434)   20  11:55    


 


MPV  10.8 fl (7.5-11.1)   20  11:55    


 


Sodium  136 mmol/L (136-145)   20  11:55    


 


Potassium  3.9 mmol/L (3.5-5.1)   20  11:55    


 


Chloride  102 mmol/L ()   20  11:55    


 


Carbon Dioxide  27 mmol/L (21-32)   20  11:55    


 


Anion Gap  6 MMOL/L (8-16)  L  20  11:55    


 


BUN  15.8 mg/dL (7-18)   20  11:55    


 


Creatinine  0.8 mg/dL (0.55-1.3)   20  11:55    


 


Est GFR (CKD-EPI)AfAm  139.91   20  11:55    


 


Est GFR (CKD-EPI)NonAf  120.72   20  11:55    


 


Random Glucose  105 mg/dL ()   20  11:55    


 


Calcium  8.7 mg/dL (8.5-10.1)   20  11:55    


 


Total Bilirubin  0.3 mg/dL (0.2-1)   20  11:55    


 


AST  25 U/L (15-37)   20  11:55    


 


ALT  40 U/L (13-61)   20  11:55    


 


Alkaline Phosphatase  82 U/L ()   20  11:55    


 


Total Protein  7.3 g/dl (6.4-8.2)   20  11:55    


 


Albumin  3.8 g/dl (3.4-5.0)   20  11:55    


 


RPR Titer  Nonreactive  (NONREACTIVE)   20  11:55    








lab noted


Assessment: 





20 10:41


alcohol opiate withdrawal


Plan: 





valium and methadone regimens

## 2020-01-09 RX ADMIN — Medication SCH MG: at 21:56

## 2020-01-09 RX ADMIN — NICOTINE SCH: 14 PATCH, EXTENDED RELEASE TRANSDERMAL at 09:36

## 2020-01-09 RX ADMIN — Medication PRN MG: at 21:58

## 2020-01-09 RX ADMIN — METHOCARBAMOL PRN MG: 500 TABLET ORAL at 21:56

## 2020-01-09 RX ADMIN — HYDROXYZINE PAMOATE PRN MG: 25 CAPSULE ORAL at 10:59

## 2020-01-09 RX ADMIN — HYDROXYZINE PAMOATE PRN MG: 25 CAPSULE ORAL at 17:17

## 2020-01-09 RX ADMIN — METHOCARBAMOL PRN MG: 500 TABLET ORAL at 09:36

## 2020-01-09 RX ADMIN — Medication SCH TAB: at 09:36

## 2020-01-09 NOTE — PN
S CIWA





- CIWA Score


Nausea/Vomitin-Mild Nausea/No Vomiting


Muscle Tremors: None


Anxiety: 2


Agitation: 1-Slight > Activity


Paroxysmal Sweats: 1-Minimal Palms Moist


Orientation: 0-Oriented


Tacttile Disturbances: 1-Very Mild Itch/Numbness


Auditory Disturbances: 0-None


Visual Disturbances: 1-Very Mild Sensitivity


Headache: 1-Very Mild


CIWA-Ar Total Score: 8





BHS COWS





- Scale


Resting Pulse: 0= MD 80 or Below


Sweatin= No chills or Flushing


Restless Observation: 0= Sits Still


Pupil Size: 0= Normal to Room Light


Bone or Joint Aches: 1= Mild Discomfort


Runny Nose/ Eye Tearin= Nasal Congestion


GI Upset > 30mins: 2= Nausea/Diarrhea


Tremor Observation of Outstretched Hands: 2= Slight Tremor Visible


Yawning Observation: 1= 1-2x During Session


Anxiety or Irritability: 1=Feels Anxious/Irritable


Goose Flesh Skin: 0=Smooth Skin


COWS Score: 8





BHS Progress Note (SOAP)


Subjective: 





29 years old male admitted on 20 for alcohol and opiate withdrawal sx 

management treating with valium and methadone detox regimen feeling ok today 

encourage the patient to  narcan from pharmacy


Objective: 





20 10:53


 Vital Signs











Temperature  98.2 F   20 09:30


 


Pulse Rate  66   20 09:30


 


Respiratory Rate  18   20 09:30


 


Blood Pressure  93/62   20 09:30


 


O2 Sat by Pulse Oximetry (%)      








 Laboratory Last Values











WBC  9.8 K/mm3 (4.0-10.0)   20  11:55    


 


RBC  4.86 M/mm3 (4.00-5.60)   20  11:55    


 


Hgb  14.1 GM/dL (11.7-16.9)   20  11:55    


 


Hct  42.7 % (35.4-49)   20  11:55    


 


MCV  87.8 fl (80-96)   20  11:55    


 


MCH  28.9 pg (25.7-33.7)   20  11:55    


 


MCHC  32.9 g/dl (32.0-35.9)   20  11:55    


 


RDW  13.6 % (11.9-15.9)   20  11:55    


 


Plt Count  193 K/MM3 (134-434)   20  11:55    


 


MPV  10.8 fl (7.5-11.1)   20  11:55    


 


Sodium  136 mmol/L (136-145)   20  11:55    


 


Potassium  3.9 mmol/L (3.5-5.1)   20  11:55    


 


Chloride  102 mmol/L ()   20  11:55    


 


Carbon Dioxide  27 mmol/L (21-32)   20  11:55    


 


Anion Gap  6 MMOL/L (8-16)  L  20  11:55    


 


BUN  15.8 mg/dL (7-18)   20  11:55    


 


Creatinine  0.8 mg/dL (0.55-1.3)   20  11:55    


 


Est GFR (CKD-EPI)AfAm  139.91   20  11:55    


 


Est GFR (CKD-EPI)NonAf  120.72   20  11:55    


 


Random Glucose  105 mg/dL ()   20  11:55    


 


Calcium  8.7 mg/dL (8.5-10.1)   20  11:55    


 


Total Bilirubin  0.3 mg/dL (0.2-1)   20  11:55    


 


AST  25 U/L (15-37)   20  11:55    


 


ALT  40 U/L (13-61)   20  11:55    


 


Alkaline Phosphatase  82 U/L ()   20  11:55    


 


Total Protein  7.3 g/dl (6.4-8.2)   20  11:55    


 


Albumin  3.8 g/dl (3.4-5.0)   20  11:55    


 


RPR Titer  Nonreactive  (NONREACTIVE)   20  11:55    








lab noted


Assessment: 





20 10:53


alcohol and opiate withdrawal 


Plan: 





valium and methadone regimens

## 2020-01-10 RX ADMIN — METHOCARBAMOL PRN MG: 500 TABLET ORAL at 17:24

## 2020-01-10 RX ADMIN — Medication PRN MG: at 22:06

## 2020-01-10 RX ADMIN — Medication SCH MG: at 22:06

## 2020-01-10 RX ADMIN — HYDROXYZINE PAMOATE PRN MG: 25 CAPSULE ORAL at 09:17

## 2020-01-10 RX ADMIN — Medication SCH TAB: at 09:17

## 2020-01-10 RX ADMIN — HYDROXYZINE PAMOATE PRN MG: 25 CAPSULE ORAL at 12:40

## 2020-01-10 RX ADMIN — HYDROXYZINE PAMOATE PRN MG: 25 CAPSULE ORAL at 16:29

## 2020-01-10 RX ADMIN — NICOTINE SCH: 14 PATCH, EXTENDED RELEASE TRANSDERMAL at 09:20

## 2020-01-10 RX ADMIN — IBUPROFEN PRN MG: 400 TABLET, FILM COATED ORAL at 11:05

## 2020-01-10 RX ADMIN — HYDROXYZINE PAMOATE PRN MG: 25 CAPSULE ORAL at 22:07

## 2020-01-10 RX ADMIN — METHOCARBAMOL PRN MG: 500 TABLET ORAL at 11:05

## 2020-01-10 NOTE — PN
[facility] CIWA





- CIWA Score


Nausea/Vomitin-Mild Nausea/No Vomiting


Muscle Tremors: 3


Anxiety: 2


Agitation: 2


Paroxysmal Sweats: 2


Orientation: 0-Oriented


Tacttile Disturbances: 0-None


Auditory Disturbances: 0-None


Visual Disturbances: 0-None


Headache: 0-None Present


CIWA-Ar Total Score: 10





[facility]HS COWS





- Scale


Resting Pulse: 1= [place] 


Sweatin= Chills/Flushing


Restless Observation: 1= Difficult to Sit Still


Pupil Size: 0= Normal to Room Light


Bone or Joint Aches: 1= Mild Discomfort


Runny Nose/ Eye Tearin= Nasal Congestion


GI Upset > 30mins: 1= Stomach Cramp


Tremor Observation of Outstretched Hands: 1= Tremor Felt, Not Seen


Yawning Observation: 1= 1-2x During Session


Anxiety or Irritability: 2=Irritable/Anxious


Goose Flesh Skin: 0=Smooth Skin


COWS Score: 10





BHS Progress Note (SOAP)


Subjective: 





29 years old male admitted on 20 for alcohol and opiate withdrawal sx 

management treating with valium and methadone detox regimen- states he feels 

anxious and has sweats and chills- withdrawal sx





O:


 Vital Signs - 24 hr











  20





  13:01 17:56 22:43


 


Temperature 96.9 F L 98.5 F 97.6 F


 


Pulse Rate 54 L 86 68


 


Respiratory 18 18 18





Rate   


 


Blood Pressure 108/72 97/61 113/77














  01/10/20 01/10/20 01/10/20





  00:28 03:30 05:55


 


Temperature   97.5 F L


 


Pulse Rate   53 L


 


Respiratory 18 18 18





Rate   


 


Blood Pressure   99/64














  01/10/20





  09:15


 


Temperature 98.1 F


 


Pulse Rate 64


 


Respiratory 16





Rate 


 


Blood Pressure 101/66








 Laboratory Tests











  20





  11:55 11:55 11:55


 


WBC  9.8  


 


RBC  4.86  


 


Hgb  14.1  


 


Hct  42.7  


 


MCV  87.8  


 


MCH  28.9  


 


MCHC  32.9  


 


RDW  13.6  


 


Plt Count  193  


 


MPV  10.8  


 


Sodium   136 


 


Potassium   3.9 


 


Chloride   102 


 


Carbon Dioxide   27 


 


Anion Gap   6 L 


 


BUN   15.8 


 


Creatinine   0.8 


 


Est GFR (CKD-EPI)AfAm   139.91 


 


Est GFR (CKD-EPI)NonAf   120.72 


 


Random Glucose   105 


 


Calcium   8.7 


 


Total Bilirubin   0.3 


 


AST   25 


 


ALT   40 


 


Alkaline Phosphatase   82 


 


Total Protein   7.3 


 


Albumin   3.8 


 


RPR Titer    Nonreactive








labs and VS WNL








a/p: continue detox regimens. Pt has prn meds for symptom control.. d/w pt long 

term MAT and rehab: to further discuss with counselor

## 2020-01-11 RX ADMIN — METHOCARBAMOL PRN MG: 500 TABLET ORAL at 13:35

## 2020-01-11 RX ADMIN — Medication PRN MG: at 22:19

## 2020-01-11 RX ADMIN — HYDROXYZINE PAMOATE PRN MG: 25 CAPSULE ORAL at 07:49

## 2020-01-11 RX ADMIN — HYDROXYZINE PAMOATE PRN MG: 25 CAPSULE ORAL at 17:25

## 2020-01-11 RX ADMIN — NICOTINE SCH: 14 PATCH, EXTENDED RELEASE TRANSDERMAL at 10:03

## 2020-01-11 RX ADMIN — Medication SCH TAB: at 10:03

## 2020-01-11 RX ADMIN — HYDROXYZINE PAMOATE PRN MG: 25 CAPSULE ORAL at 13:35

## 2020-01-11 RX ADMIN — Medication SCH MG: at 22:19

## 2020-01-11 RX ADMIN — METHOCARBAMOL PRN MG: 500 TABLET ORAL at 22:20

## 2020-01-11 RX ADMIN — HYDROXYZINE PAMOATE PRN MG: 25 CAPSULE ORAL at 20:12

## 2020-01-11 NOTE — PN
[facility] CIWA





- CIWA Score


Nausea/Vomitin-No Nausea/No Vomiting


Muscle Tremors: None


Anxiety: 2


Agitation: 0-Normal Activity


Paroxysmal Sweats: 2


Orientation: 0-Oriented


Tacttile Disturbances: 0-None


Auditory Disturbances: 0-None


Visual Disturbances: 0-None


Headache: 0-None Present


CIWA-Ar Total Score: 4





BHS COWS





- Scale


Resting Pulse: 0= NV 80 or Below


Sweatin= Chills/Flushing


Restless Observation: 0= Sits Still


Pupil Size: 0= Normal to Room Light


Bone or Joint Aches: 0= None


Runny Nose/ Eye Tearin= None


GI Upset > 30mins: 0= None


Tremor Observation of Outstretched Hands: 0= None


Yawning Observation: 0= None


Anxiety or Irritability: 2=Irritable/Anxious


Goose Flesh Skin: 0=Smooth Skin


COWS Score: 3





BHS Progress Note (SOAP)


Subjective: 





c/o mild withdrawal symptoms.


Objective: 





20 10:45


 Vital Signs











  20





  06:42 09:34


 


Temperature 97.5 F L 97.4 F L


 


Pulse Rate 61 80


 


Respiratory 18 18





Rate  


 


Blood Pressure 96/62 118/78








 Laboratory Last Values











WBC  9.8 K/mm3 (4.0-10.0)   20  11:55    


 


RBC  4.86 M/mm3 (4.00-5.60)   20  11:55    


 


Hgb  14.1 GM/dL (11.7-16.9)   20  11:55    


 


Hct  42.7 % (35.4-49)   20  11:55    


 


MCV  87.8 fl (80-96)   20  11:55    


 


MCH  28.9 pg (25.7-33.7)   20  11:55    


 


MCHC  32.9 g/dl (32.0-35.9)   20  11:55    


 


RDW  13.6 % (11.9-15.9)   20  11:55    


 


Plt Count  193 K/MM3 (134-434)   20  11:55    


 


MPV  10.8 fl (7.5-11.1)   20  11:55    


 


Sodium  136 mmol/L (136-145)   20  11:55    


 


Potassium  3.9 mmol/L (3.5-5.1)   20  11:55    


 


Chloride  102 mmol/L ()   20  11:55    


 


Carbon Dioxide  27 mmol/L (21-32)   20  11:55    


 


Anion Gap  6 MMOL/L (8-16)  L  20  11:55    


 


BUN  15.8 mg/dL (7-18)   20  11:55    


 


Creatinine  0.8 mg/dL (0.55-1.3)   20  11:55    


 


Est GFR (CKD-EPI)AfAm  139.91   20  11:55    


 


Est GFR (CKD-EPI)NonAf  120.72   20  11:55    


 


Random Glucose  105 mg/dL ()   20  11:55    


 


Calcium  8.7 mg/dL (8.5-10.1)   20  11:55    


 


Total Bilirubin  0.3 mg/dL (0.2-1)   20  11:55    


 


AST  25 U/L (15-37)   20  11:55    


 


ALT  40 U/L (13-61)   20  11:55    


 


Alkaline Phosphatase  82 U/L ()   20  11:55    


 


Total Protein  7.3 g/dl (6.4-8.2)   20  11:55    


 


Albumin  3.8 g/dl (3.4-5.0)   20  11:55    


 


RPR Titer  Nonreactive  (NONREACTIVE)   20  11:55    








Labs noted.


Assessment: 





20 10:45


AOX3, in no acute respiratory distress.


Full ROM, ambulating in the unit.


Mild withdrawal symptoms.


For d/c tomorrow.


20 10:46





Plan: 





continue detox.


D/C in AM.

## 2020-01-12 VITALS — HEART RATE: 72 BPM | TEMPERATURE: 97.6 F | SYSTOLIC BLOOD PRESSURE: 120 MMHG | DIASTOLIC BLOOD PRESSURE: 84 MMHG

## 2020-01-12 RX ADMIN — HYDROXYZINE PAMOATE PRN MG: 25 CAPSULE ORAL at 03:07

## 2020-01-12 RX ADMIN — IBUPROFEN PRN MG: 400 TABLET, FILM COATED ORAL at 03:07

## 2020-01-12 NOTE — DS
[facility]HS Detox Discharge Summary


Admission Date: 


20





Discharge Date: 20





- History


Present History: Alcohol Dependence, Opioid Dependence


Additional Comments: 





29 years old male admitted on 20 for alcohol and opiate withdrawal sx 

management treated wtih valium and methadone detox regimens 


completed detox regimen tolerated well alert oriented x 3


cardiac s1s2 regular rate rhythm


respiratory clear lungs bilaterally on auscultation 


skin warm and dry





- Physical Exam Results


Vital Signs: 


 Vital Signs











Temperature  97.6 F   20 06:39


 


Pulse Rate  72   20 06:39


 


Respiratory Rate  18   20 06:39


 


Blood Pressure  120/84   20 06:39


 


O2 Sat by Pulse Oximetry (%)      











Pertinent Admission Physical Exam Findings: 





alcohol and opiate withdrawal 


 Laboratory Last Values











WBC  9.8 K/mm3 (4.0-10.0)   20  11:55    


 


RBC  4.86 M/mm3 (4.00-5.60)   20  11:55    


 


Hgb  14.1 GM/dL (11.7-16.9)   20  11:55    


 


Hct  42.7 % (35.4-49)   20  11:55    


 


MCV  87.8 fl (80-96)   20  11:55    


 


MCH  28.9 pg (25.7-33.7)   20  11:55    


 


MCHC  32.9 g/dl (32.0-35.9)   20  11:55    


 


RDW  13.6 % (11.9-15.9)   20  11:55    


 


Plt Count  193 K/MM3 (134-434)   20  11:55    


 


MPV  10.8 fl (7.5-11.1)   20  11:55    


 


Sodium  136 mmol/L (136-145)   20  11:55    


 


Potassium  3.9 mmol/L (3.5-5.1)   20  11:55    


 


Chloride  102 mmol/L ()   20  11:55    


 


Carbon Dioxide  27 mmol/L (21-32)   20  11:55    


 


Anion Gap  6 MMOL/L (8-16)  L  20  11:55    


 


BUN  15.8 mg/dL (7-18)   20  11:55    


 


Creatinine  0.8 mg/dL (0.55-1.3)   20  11:55    


 


Est GFR (CKD-EPI)AfAm  139.91   20  11:55    


 


Est GFR (CKD-EPI)NonAf  120.72   20  11:55    


 


Random Glucose  105 mg/dL ()   20  11:55    


 


Calcium  8.7 mg/dL (8.5-10.1)   20  11:55    


 


Total Bilirubin  0.3 mg/dL (0.2-1)   20  11:55    


 


AST  25 U/L (15-37)   20  11:55    


 


ALT  40 U/L (13-61)   20  11:55    


 


Alkaline Phosphatase  82 U/L ()   20  11:55    


 


Total Protein  7.3 g/dl (6.4-8.2)   20  11:55    


 


Albumin  3.8 g/dl (3.4-5.0)   20  11:55    


 


RPR Titer  Nonreactive  (NONREACTIVE)   20  11:55    








lab noted





- Treatment


Hospital Course: Detox Protocol Followed, Detoxed Safely, Responded well, 

Discharged Condition Good, Rehab Referral Accepted


Patient has Accepted a Rehab Referral to[facility]





- Medication


Discharge Medications: 


Ambulatory Orders





Naloxone HCl [Narcan] 4 mg NS ASDIR PRN #1 spray 20 











- Diagnosis


(1) Alcohol dependence with uncomplicated withdrawal


Status: Acute   





(2) Nicotine dependence


Status: Acute   


Qualifiers: 


   Nicotine product type: cigarettes   Substance use status: in withdrawal   

Qualified Code(s): F17.213 - Nicotine dependence, cigarettes, with withdrawal   





(3) Opioid dependence with withdrawal


Status: Acute   





- AMA


Did Patient Leave Against Medical Advice: No





CIWA Score





- CIWA Score


Nausea/Vomitin-No Nausea/No Vomiting


Muscle Tremors: None


Anxiety: 1-Mildly Anxious


Agitation: 0-Normal Activity


Paroxysmal Sweats: 1-Minimal Palms Moist


Orientation: 0-Oriented


Tacttile Disturbances: 0-None


Auditory Disturbances: 0-None


Visual Disturbances: 0-None


Headache: 0-None Present


CIWA-Ar Total Score: 2





COWS (PN)





- Opiate Withdrawal


Resting Pulse: 0= [place] 80 or Below


Sweatin= Chills/Flushing


Restless Observation: 0= Sits Still


Pupil Size: 0= Normal to Room Light


Bone or Joint Aches: 0= None


Runny Nose/ Eye Tearin= None


GI Upset > 30mins: 0= None


Tremor Observation of Outstretched Hands: 0= None


Yawning Observation: 0= None


Anxiety or Irritability: 1=Feels Anxious/Irritable


Goose Flesh Skin: 0=Smooth Skin


COWS Score: 2

## 2020-02-24 ENCOUNTER — HOSPITAL ENCOUNTER (INPATIENT)
Dept: HOSPITAL 74 - YASAS | Age: 30
LOS: 4 days | Discharge: HOME | DRG: 773 | End: 2020-02-28
Attending: ALLERGY & IMMUNOLOGY | Admitting: ALLERGY & IMMUNOLOGY
Payer: COMMERCIAL

## 2020-02-24 VITALS — BODY MASS INDEX: 29 KG/M2

## 2020-02-24 DIAGNOSIS — F14.20: ICD-10-CM

## 2020-02-24 DIAGNOSIS — F13.230: ICD-10-CM

## 2020-02-24 DIAGNOSIS — L98.8: ICD-10-CM

## 2020-02-24 DIAGNOSIS — F10.230: Primary | ICD-10-CM

## 2020-02-24 DIAGNOSIS — F11.23: ICD-10-CM

## 2020-02-24 DIAGNOSIS — F12.20: ICD-10-CM

## 2020-02-24 DIAGNOSIS — J40: ICD-10-CM

## 2020-02-24 DIAGNOSIS — L98.499: ICD-10-CM

## 2020-02-24 DIAGNOSIS — F17.210: ICD-10-CM

## 2020-02-24 PROCEDURE — HZ2ZZZZ DETOXIFICATION SERVICES FOR SUBSTANCE ABUSE TREATMENT: ICD-10-PCS | Performed by: ALLERGY & IMMUNOLOGY

## 2020-02-24 NOTE — BHS.RME
Substance Use & Tx History





- Last Treatment


Where was last treatment: Detox





COWS





- Scale


Resting Pulse: 2= -120


Sweatin=Flushed/Facial Moisture


Restless Observation: 0= Sits Still


Pupil Size: 0= Normal to Room Light


Bone or Joint Aches: 4=Acute Joint/Muscle Pain


Runny Nose/ Eye Tearin= Runny Nose/Eyes


GI Upset > 30mins: 1= Stomach Cramp


Tremor Observation: 2= Slight Tremor Visible


Yawning Observation: 0= None


Anxiety or Irritability: 2=Irritable/Anxious


Goose Flesh Skin: 0=Smooth Skin


COWS Score: 15





CIWA


Nausea/Vomitin


Muscle Tremors: 3


Anxiety: 3


Agitation: 0-Normal Activity


Paroxysmal Sweats: 2


Orientation: 0-Oriented


Tacttile Disturbances: 0-None


Auditory Disturbances: 0-None


Visual Disturbances: 0-None


Headache: 3-Moderate


CIWA-Ar Total Score: 13

## 2020-02-24 NOTE — HP
COWS





- Scale


Resting Pulse: 2= -120


Sweatin=Flushed/Facial Moisture


Restless Observation: 0= Sits Still


Pupil Size: 0= Normal to Room Light


Bone or Joint Aches: 4=Acute Joint/Muscle Pain


Runny Nose/ Eye Tearin= Runny Nose/Eyes


GI Upset > 30mins: 1= Stomach Cramp


Tremor Observation: 2= Slight Tremor Visible


Yawning Observation: 0= None


Anxiety or Irritability: 2=Irritable/Anxious


Goose Flesh Skin: 0=Smooth Skin


COWS Score: 15





CIWA Score


Nausea/Vomitin


Muscle Tremors: 3


Anxiety: 3


Agitation: 0-Normal Activity


Paroxysmal Sweats: 2


Orientation: 0-Oriented


Tacttile Disturbances: 0-None


Auditory Disturbances: 0-None


Visual Disturbances: 0-None


Headache: 3-Moderate


CIWA-Ar Total Score: 13





- Admission Criteria


OASAS Guidelines: Admission for Medically Managed Detox: 


Requires at least one of the followin. CIWA greater than 12


2. Seizures within the past 24 hours


3. Delirium tremens within the past 24 hours


4. Hallucinations within the past 24 hours


5. Acute intervention needed for co  occurring medical disorder


6. Acute intervention needed for co  occurring psychiatric disorder


7. Severe withdrawal that cannot be handled at a lower level of care (continued


    vomiting, continued diarrhea, abnormal vital signs) requiring intravenous


    medication and/or fluids


8. Pregnancy








Admitting History and Physical





- Past Surgical History


Past Surgical History: Yes: None





- Smoking History


Smoking history: Current every day smoker


Have you smoked in the past 12 months: Yes


Aproximately how many cigarettes per day: 20





- Alcohol/Substance Use


History of Substance Use: reports: Heroin





- Social History


ADL: Independent


Occupation: unemployed


History of Recent Travel: Yes





Admission ROS [facility]


Chief Complaint: 





Seeking admission to detox from alcohol and opioid


Allergies/Adverse Reactions: 


 Allergies











Allergy/AdvReac Type Severity Reaction Status Date / Time


 


chlordiazepoxide Allergy Mild Vomiting Verified 20 22:05





[From Librium]     











History of Present Illness: 





29 years old male with 2 years of fentanyl dependence and eleven years of 

alcohol dependence is seeking admission to detox. Patient  reports 

insignificant period of sobriety. He denies medical history, psych. history and 

suicidal ideation at this time. He denies + eye opener, alcohol related 

seizures and reports blackouts.


Exam Limitations: No Limitations





- Ebola screening


Have you traveled outside of the country in the last 21 days: No


Have you had contact with anyone from an Ebola affected area: No


Do you have a fever: No





- Review of Systems


Constitutional: Chills, Malaise, Night Sweats, Changes in sleep


EENT: reports: Sinus Pressure


Respiratory: reports: No Symptoms reported


Cardiac: reports: No Symptoms Reported


GI: reports: Nausea, Poor Appetite, Poor Fluid Intake, Abdominal cramping


: reports: No Symptoms Reported


Musculoskeletal: reports: Back Pain, Joint Pain, Muscle Pain


Integumentary: reports: Dryness


Neuro: reports: Headache, Tremors


Endocrine: reports: No Symptoms Reported


Hematology: reports: No Symptoms Reported


Psychiatric: reports: Mood/Affect Appropiate, Orientated x3, Anxious, Depressed


Other Systems: Reviewed and Negative





Patient History





- Patient Medical History


Hx Anemia: No


Hx Asthma: No


Hx Chronic Obstructive Pulmonary Disease (COPD): No


Hx Cancer: No


Hx Cardiac Disorders: No


Hx Congestive Heart Failure: No


Hx Hypertension: No


Hx Hypercholesterolemia: No


Hx Pacemaker: No


HX Cerebrovascular Accident: No


Hx Seizures: No


Hx Dementia: No


Hx Diabetes: No


Hx Gastrointestinal Disorders: No


Hx Liver Disease: No


Hx Genitourinary Disorders: No


Hx Sexually Transmitted Disorders: No


Hx Renal Disease (ESRD): No


Hx Thyroid Disease: No


Hx Human Immunodeficiency Virus (HIV): No (Negative 2020)


Hx Hepatitis C: No ( )


Hx Depression: No


Hx Suicide Attempt: No (Denies [id number] ideationb)


Hx Bipolar Disorder: No


Hx Schizophrenia: No





- Patient Surgical History


Past Surgical History: No


Hx Neurologic Surgery: No


Hx Cataract Extraction: No


Hx Cardiac Surgery: No


Hx Lung Surgery: No


Hx Abdominal Surgery: No


Hx Appendectomy: No


Hx Cholecystectomy: No


Hx Genitourinary Surgery: No


Hx Orthopedic Surgery: No


Anesthesia Reaction: No





- PPD History


Previous Implant?: Yes


Documented Results: Negative w/proof


Implanted On Prior [facility] Admission?: Yes


Date: 07/10/19


Results: 0MM


PPD to be Administered?: No





- Reproductive History


Patient is a Female of Child Bearing Age (11 -55 yrs old): No (m,naty)





- Smoking Cessation


Smoking history: Current every day smoker


Have you smoked in the past 12 months: Yes


Aproximately how many cigarettes per day: 20


Cigars Per Day: 0


Hx Chewing Tobacco Use: No


Initiated information on smoking cessation: Yes


'Breaking Loose' booklet given: 20





- Substance & Tx. History


Hx Alcohol Use: Yes


Hx Substance Use: Yes


Substance Use Type: Alcohol, Opiates


Hx Substance Use Treatment: Yes ([facility])





- Substances abused


  ** Other


Other (specify): FENTNYL


Substance route: Inhalation


Frequency: Daily


Amount used: 1 BUNDLE (10 bags)


Age of first use: 27


Date of last use: 20





  ** Alcohol


Substance route: Oral


Frequency: Daily


Amount used: 1 PINT OF LIQUOR


Age of first use: 18


Date of last use: 20





  ** Alprazolam (Xanax)


Substance route: Inhalation


Frequency: Daily


Amount used: 4 MG/DAY


Age of first use: 29


Date of last use: 20





Admission Physical Exam [facility]





- Vital Signs


Vital Signs: 


 Vital Signs - 24 hr











  20





  22:05


 


Temperature 97.6 F


 


Pulse Rate 101 H


 


Respiratory 20





Rate 


 


Blood Pressure 137/87














- Physical


General Appearance: Yes: Moderate Distress, Tremorous, Sweating, Anxious


HEENTM: Yes: Within Normal Limits


Respiratory: Yes: Lungs Clear, Normal Breath Sounds, No Respiratory Distress


Neck: Yes: Within Normal Limits


Breast: Yes: Breast Exam Deferred


Cardiology: Yes: Tachycardia


Abdominal: Yes: Within Normal Limits


Genitourinary: Yes: Within Normal Limits


Back: Yes: Normal Inspection


Musculoskeletal: Yes: Back pain, Muscle Pain


Extremities: Yes: Normal Inspection


Neurological: Yes: Within Normal Limits, Alert, Normal Mood/Affect


Integumentary: Yes: Warm


Lymphatic: Yes: Within Normal Limits





- Diagnostic


(1) Sedative, hypnotic or anxiolytic dependence with withdrawal, uncomplicated


Current Visit: Yes   Status: Acute   





(2) Alcohol dependence with uncomplicated withdrawal


Current Visit: Yes   Status: Acute   





(3) Nicotine dependence


Current Visit: Yes   Status: Chronic   


Qualifiers: 


   Nicotine product type: cigarettes   Substance use status: in withdrawal   

Qualified Code(s): F17.213 - Nicotine dependence, cigarettes, with withdrawal   





(4) Opioid dependence with withdrawal


Current Visit: Yes   Status: Acute   





Cleared for Admission [facility]





- Detox or Rehab


[facility] Level of Care: Medically Managed


Detox Regimen/Protocol: Methadone/Valium


Claeared for Rehab Admission: No





Breathalyzer





- Breathalyzer


Breathalyzer: 0





Urine Drug Screen





- Test Device


Lot number: [id number]


Expiration date: 21





- Control


Is test valid?: Yes





- Results


Drug screen NEGATIVE: Yes


Urine drug screen results: FEN-Fentanyl, MOP-Opiates





Inpatient Rehab Admission





- Rehab Decision to Admit


Inpatient rehab admission?: No

## 2020-02-24 NOTE — HP
COWS





- Scale


Resting Pulse: 2= -120


Sweatin=Flushed/Facial Moisture


Restless Observation: 0= Sits Still


Pupil Size: 0= Normal to Room Light


Bone or Joint Aches: 4=Acute Joint/Muscle Pain


Runny Nose/ Eye Tearin= Runny Nose/Eyes


GI Upset > 30mins: 1= Stomach Cramp


Tremor Observation: 2= Slight Tremor Visible


Yawning Observation: 0= None


Anxiety or Irritability: 2=Irritable/Anxious


Goose Flesh Skin: 0=Smooth Skin


COWS Score: 15





CIWA Score





- Admission Criteria


OASAS Guidelines: Admission for Medically Managed Detox: 


Requires at least one of the followin. CIWA greater than 12


2. Seizures within the past 24 hours


3. Delirium tremens within the past 24 hours


4. Hallucinations within the past 24 hours


5. Acute intervention needed for co  occurring medical disorder


6. Acute intervention needed for co  occurring psychiatric disorder


7. Severe withdrawal that cannot be handled at a lower level of care (continued


    vomiting, continued diarrhea, abnormal vital signs) requiring intravenous


    medication and/or fluids


8. Pregnancy








Admitting History and Physical





- Past Surgical History


Past Surgical History: Yes: None





- Smoking History


Smoking history: Current every day smoker


Have you smoked in the past 12 months: Yes


Aproximately how many cigarettes per day: 20





- Alcohol/Substance Use


Hx Alcohol Use: Yes


History of Substance Use: reports: Heroin





- Social History


ADL: Independent


Occupation: unemployed


History of Recent Travel: Yes





Admission [facility]


Allergies/Adverse Reactions: 


 Allergies











Allergy/AdvReac Type Severity Reaction Status Date / Time


 


chlordiazepoxide Allergy Mild Vomiting Verified 20 22:05





[From Librium]     














Patient History





- Patient Medical History


Hx Anemia: No


Hx Asthma: No


Hx Chronic Obstructive Pulmonary Disease (COPD): No


Hx Cancer: No


Hx Cardiac Disorders: No


Hx Congestive Heart Failure: No


Hx Hypertension: No


Hx Hypercholesterolemia: No


Hx Pacemaker: No


HX Cerebrovascular Accident: No


Hx Seizures: No


Hx Dementia: No


Hx Diabetes: No


Hx Gastrointestinal Disorders: No


Hx Liver Disease: No


Hx Genitourinary Disorders: No


Hx Sexually Transmitted Disorders: No


Hx Renal Disease (ESRD): No


Hx Thyroid Disease: No


Hx Human Immunodeficiency Virus (HIV): No


Hx Hepatitis C: No ( )


Hx Depression: No


Hx Suicide Attempt: No


Hx Bipolar Disorder: No


Hx Schizophrenia: No





- Patient Surgical History


Past Surgical History: No


Hx Neurologic Surgery: No


Hx Cataract Extraction: No


Hx Cardiac Surgery: No


Hx Lung Surgery: No


Hx Breast Surgery: No


Hx Breast Biopsy: No


Hx Abdominal Surgery: No


Hx Appendectomy: No


Hx Cholecystectomy: No


Hx Genitourinary Surgery: No


Hx  Section: No


Hx Orthopedic Surgery: No


Anesthesia Reaction: No





- PPD History


Previous Implant?: Yes


Documented Results: Negative w/proof


Date: 07/10/19


Results: 0MM





- Smoking Cessation


Smoking history: Current every day smoker


Have you smoked in the past 12 months: Yes


Aproximately how many cigarettes per day: 20


Cigars Per Day: 0


Hx Chewing Tobacco Use: No





- Substances abused


  ** Other


Other (specify): FENTNYL


Substance route: Inhalation


Frequency: Daily


Amount used: 1 BUNDLE


Age of first use: 27


Date of last use: 20





  ** Alcohol


Substance route: Oral


Frequency: Daily


Amount used: 1 PINT OF LIQUOR


Age of first use: 18


Date of last use: 20





  ** Alprazolam (Xanax)


Substance route: Inhalation


Frequency: Daily


Amount used: 4 MG/DAY


Age of first use: 29


Date of last use: 20





Admission Physical Exam [facility]





- Vital Signs


Vital Signs: 


 Vital Signs - 24 hr











  20





  22:05


 


Temperature 97.6 F


 


Pulse Rate 101 H


 


Respiratory 20





Rate 


 


Blood Pressure 137/87














Breathalyzer





- Breathalyzer


Breathalyzer: 0





Urine Drug Screen





- Test Device


Lot number: [id number]


Expiration date: 21





- Control


Is test valid?: Yes





- Results


Drug screen NEGATIVE: Yes


Urine drug screen results: FEN-Fentanyl, MOP-Opiates

## 2020-02-25 RX ADMIN — Medication SCH: at 22:51

## 2020-02-25 RX ADMIN — Medication SCH: at 10:29

## 2020-02-25 RX ADMIN — Medication PRN MG: at 22:50

## 2020-02-25 RX ADMIN — NICOTINE SCH: 21 PATCH TRANSDERMAL at 10:28

## 2020-02-25 NOTE — PN
[facility] CIWA





- CIWA Score


Nausea/Vomitin


Muscle Tremors: 3


Anxiety: 3


Agitation: 3


Paroxysmal Sweats: 1-Minimal Palms Moist


Orientation: 0-Oriented


Tacttile Disturbances: 1-Very Mild Itch/Numbness


Auditory Disturbances: 0-None


Visual Disturbances: 0-None


Headache: 1-Very Mild


CIWA-Ar Total Score: 14





[facility] COWS





- Scale


Resting Pulse: 0= [place] 80 or Below


Sweatin= No chills or Flushing


Restless Observation: 1= Difficult to Sit Still


Pupil Size: 1= Pupils >than Normal


Bone or Joint Aches: 2= Severe Diffuse Aches


Runny Nose/ Eye Tearin= Nasal Congestion


GI Upset > 30mins: 1= Stomach Cramp


Tremor Observation of Outstretched Hands: 2= Slight Tremor Visible


Yawning Observation: 1= 1-2x During Session


Anxiety or Irritability: 2=Irritable/Anxious


Goose Flesh Skin: 0=Smooth Skin


COWS Score: 11





BHS Progress Note (SOAP)


Subjective: 





alert,irritable,anxious,interrupted sleep,pain in the body and back


Objective: 





20 11:50


 Vital Signs











Temperature  99.1 F   20 09:18


 


Pulse Rate  75   20 09:18


 


Respiratory Rate  18   20 09:18


 


Blood Pressure  100/68   20 09:18


 


O2 Sat by Pulse Oximetry (%)      











Assessment: 





20 11:51


withdrawal symptom


Plan: 





continue detox methadone and valium regimen

## 2020-02-25 NOTE — EKG
Test Reason : 

Blood Pressure : ***/*** mmHG

Vent. Rate : 097 BPM     Atrial Rate : 097 BPM

   P-R Int : 166 ms          QRS Dur : 092 ms

    QT Int : 350 ms       P-R-T Axes : 058 067 031 degrees

   QTc Int : 444 ms

 

NORMAL SINUS RHYTHM

NORMAL ECG

WHEN COMPARED WITH ECG OF 16-DEC-2019 09:00,

NO SIGNIFICANT CHANGE WAS FOUND

Confirmed by Sean Wiggins MD ([id number]) on 2/25/2020 9:42:08 AM

 

Referred By:             Confirmed By:Sean Wiggins MD

## 2020-02-26 RX ADMIN — NICOTINE POLACRILEX PRN MG: 4 GUM, CHEWING ORAL at 17:17

## 2020-02-26 RX ADMIN — Medication SCH TAB: at 10:44

## 2020-02-26 RX ADMIN — Medication SCH: at 22:50

## 2020-02-26 RX ADMIN — NICOTINE SCH: 21 PATCH TRANSDERMAL at 10:44

## 2020-02-26 NOTE — PN
[facility] CIWA





- CIWA Score


Nausea/Vomitin


Muscle Tremors: 1-None Visible, but Felt


Anxiety: 1-Mildly Anxious


Agitation: 1-Slight > Activity


Paroxysmal Sweats: 3


Orientation: 0-Oriented


Tacttile Disturbances: 1-Very Mild Itch/Numbness


Auditory Disturbances: 0-None


Visual Disturbances: 0-None


Headache: 0-None Present


CIWA-Ar Total Score: 9





[facility] COWS





- Scale


Resting Pulse: 0= [place] 80 or Below


Sweatin= Chills/Flushing


Restless Observation: 1= Difficult to Sit Still


Pupil Size: 1= Pupils >than Normal


Bone or Joint Aches: 1= Mild Discomfort


Runny Nose/ Eye Tearin= Nasal Congestion


GI Upset > 30mins: 2= Nausea/Diarrhea


Tremor Observation of Outstretched Hands: 1= Tremor Felt, Not Seen


Yawning Observation: 0= None


Anxiety or Irritability: 1=Feels Anxious/Irritable


Goose Flesh Skin: 0=Smooth Skin


COWS Score: 9





[facility] Progress Note (SOAP)


Subjective: 





iinterrupted sleep, sweats, nausea, cough, phelgm yellow, 


Objective: 





20 11:29


 Vital Signs











Temperature  98.1 F   20 10:51


 


Pulse Rate  73   20 10:51


 


Respiratory Rate  18   20 10:51


 


Blood Pressure  114/72   20 10:51


 


O2 Sat by Pulse Oximetry (%)      








pending labs 





pt lying in bed in nad


skin + excoriations with ulcers on forearms  


lungs mild rhonchil at bases 


 





Assessment: 





20 11:31


withdrawal sx's 


mild bronchitis 


skin excoriations 


Plan: 





cont. detox 


increase fluids 


zpack 


bacitracin oint to arms 


gingerale 


albuterol inhalor

## 2020-02-27 RX ADMIN — NICOTINE POLACRILEX PRN MG: 4 GUM, CHEWING ORAL at 12:29

## 2020-02-27 RX ADMIN — Medication PRN MG: at 22:27

## 2020-02-27 RX ADMIN — Medication SCH: at 11:06

## 2020-02-27 RX ADMIN — Medication SCH MG: at 22:27

## 2020-02-27 NOTE — PN
[facility] CIWA





- CIWA Score


Nausea/Vomitin-Mild Nausea/No Vomiting


Muscle Tremors: 2


Anxiety: 1-Mildly Anxious


Agitation: 1-Slight > Activity


Paroxysmal Sweats: 1-Minimal Palms Moist


Orientation: 0-Oriented


Tacttile Disturbances: 0-None


Auditory Disturbances: 0-None


Visual Disturbances: 0-None


Headache: 1-Very Mild


CIWA-Ar Total Score: 7





[facility] COWS





- Scale


Resting Pulse: 0= [place] 80 or Below


Sweatin= Chills/Flushing


Restless Observation: 1= Difficult to Sit Still


Pupil Size: 0= Normal to Room Light


Bone or Joint Aches: 1= Mild Discomfort


Runny Nose/ Eye Tearin= Nasal Congestion


GI Upset > 30mins: 1= Stomach Cramp


Tremor Observation of Outstretched Hands: 1= Tremor Felt, Not Seen


Yawning Observation: 1= 1-2x During Session


Anxiety or Irritability: 1=Feels Anxious/Irritable


Goose Flesh Skin: 0=Smooth Skin


COWS Score: 8





BHS Progress Note (SOAP)


Subjective: 





pt admitted for alcohol and opiate detox





O:


 Vital Signs - 24 hr











  20





  10:51 12:15 16:56


 


Temperature 98.1 F 98.1 F 99 F


 


Pulse Rate 73 75 87


 


Respiratory 18 18 20





Rate   


 


Blood Pressure 114/72 122/77 130/77














  20





  20:41 00:30 07:10


 


Temperature 98.8 F  96.7 F L


 


Pulse Rate 91 H  65


 


Respiratory 18 18 16





Rate   


 


Blood Pressure 115/76  97/67








labs pending





a/p AUD- continue detox protocol


OUD- continue detox protocol- d/w pt longterm MAT with methadone or suboxone

## 2020-02-28 VITALS — DIASTOLIC BLOOD PRESSURE: 77 MMHG | SYSTOLIC BLOOD PRESSURE: 129 MMHG | HEART RATE: 103 BPM | TEMPERATURE: 98.4 F

## 2020-02-28 RX ADMIN — Medication SCH: at 11:01

## 2020-02-28 NOTE — PN
[facility] CIWA





- CIWA Score


Nausea/Vomitin


Muscle Tremors: 1-None Visible, but Felt


Anxiety: 1-Mildly Anxious


Agitation: 1-Slight > Activity


Paroxysmal Sweats: 2


Orientation: 0-Oriented


Tacttile Disturbances: 1-Very Mild Itch/Numbness


Auditory Disturbances: 0-None


Visual Disturbances: 0-None


Headache: 0-None Present


CIWA-Ar Total Score: 8





[facility] COWS





- Scale


Resting Pulse: 0= [place] 80 or Below


Sweatin= Chills/Flushing


Restless Observation: 1= Difficult to Sit Still


Pupil Size: 1= Pupils >than Normal


Bone or Joint Aches: 1= Mild Discomfort


Runny Nose/ Eye Tearin= Nasal Congestion


GI Upset > 30mins: 2= Nausea/Diarrhea


Tremor Observation of Outstretched Hands: 1= Tremor Felt, Not Seen


Yawning Observation: 0= None


Anxiety or Irritability: 1=Feels Anxious/Irritable


Goose Flesh Skin: 0=Smooth Skin


COWS Score: 9





[facility] Progress Note (SOAP)


Subjective: 





interrupted sleep,


Objective: 





20 11:30


 Vital Signs











Temperature  97.1 F L  20 09:20


 


Pulse Rate  89   20 09:20


 


Respiratory Rate  16   20 09:20


 


Blood Pressure  114/89   20 09:20


 


O2 Sat by Pulse Oximetry (%)      








pending labs 





pt aox3 in nad ambulating 


Assessment: 





20 11:31


withdrawal sx's 


Plan: 





contr. detox 


increase fluids 


d/c in am

## 2020-02-28 NOTE — DS
[facility] Detox Discharge Summary


Admission Date: 


02/24/20





Discharge Date: 02/28/20





- History


Present History: Alcohol Dependence, Cannabis Dependence, Cocaine Dependence, 

Opioid Dependence





- Physical Exam Results


Vital Signs: 


 Vital Signs











Temperature  97.1 F L  02/28/20 13:00


 


Pulse Rate  83   02/28/20 13:00


 


Respiratory Rate  18   02/28/20 13:00


 


Blood Pressure  116/97   02/28/20 13:00


 


O2 Sat by Pulse Oximetry (%)      








pt aox3 in nad aqmbulating and appearing well wants early d/c


skin clear , anicteric 


lungs clear to a/p 


corrr


abd benign 


neuro intact





time spent on d/c 35minutes. 





- Treatment


Hospital Course: Detox Protocol Followed, Detoxed Safely, Responded well, 

Discharged Condition Good





- Medication


Discharge Medications: 


Ambulatory Orders





NK [No Known Home Medication]  02/24/20 











- Diagnosis


(1) Alcohol dependence with uncomplicated withdrawal


Current Visit: Yes   Status: Chronic   





(2) Opioid dependence with withdrawal


Current Visit: Yes   Status: Chronic   





(3) Sedative, hypnotic or anxiolytic dependence with withdrawal, uncomplicated


Current Visit: Yes   Status: Chronic   





(4) Cannabis dependence, uncomplicated


Current Visit: No   Status: Chronic   





(5) Cocaine dependence, uncomplicated


Current Visit: No   Status: Chronic   





- AMA


Did Patient Leave Against Medical Advice: No

## 2021-09-06 ENCOUNTER — HOSPITAL ENCOUNTER (EMERGENCY)
Dept: HOSPITAL 74 - JER | Age: 31
LOS: 1 days | Discharge: HOME | End: 2021-09-07
Payer: COMMERCIAL

## 2021-09-06 ENCOUNTER — HOSPITAL ENCOUNTER (EMERGENCY)
Dept: HOSPITAL 74 - JER | Age: 31
Discharge: HOME | End: 2021-09-06
Payer: COMMERCIAL

## 2021-09-06 VITALS — DIASTOLIC BLOOD PRESSURE: 90 MMHG | HEART RATE: 98 BPM | SYSTOLIC BLOOD PRESSURE: 129 MMHG | TEMPERATURE: 98.2 F

## 2021-09-06 VITALS — DIASTOLIC BLOOD PRESSURE: 94 MMHG | SYSTOLIC BLOOD PRESSURE: 140 MMHG

## 2021-09-06 VITALS — HEART RATE: 89 BPM

## 2021-09-06 VITALS — BODY MASS INDEX: 31.3 KG/M2

## 2021-09-06 VITALS — TEMPERATURE: 98.9 F

## 2021-09-06 DIAGNOSIS — Z11.52: Primary | ICD-10-CM

## 2021-09-06 DIAGNOSIS — R00.0: Primary | ICD-10-CM

## 2021-09-06 PROCEDURE — C9803 HOPD COVID-19 SPEC COLLECT: HCPCS

## 2021-09-06 PROCEDURE — U0005 INFEC AGEN DETEC AMPLI PROBE: HCPCS

## 2021-09-06 PROCEDURE — U0003 INFECTIOUS AGENT DETECTION BY NUCLEIC ACID (DNA OR RNA); SEVERE ACUTE RESPIRATORY SYNDROME CORONAVIRUS 2 (SARS-COV-2) (CORONAVIRUS DISEASE [COVID-19]), AMPLIFIED PROBE TECHNIQUE, MAKING USE OF HIGH THROUGHPUT TECHNOLOGIES AS DESCRIBED BY CMS-2020-01-R: HCPCS

## 2023-05-25 ENCOUNTER — HOSPITAL ENCOUNTER (EMERGENCY)
Dept: HOSPITAL 74 - JER | Age: 33
Discharge: HOME | End: 2023-05-25
Payer: COMMERCIAL

## 2023-05-25 ENCOUNTER — HOSPITAL ENCOUNTER (EMERGENCY)
Dept: HOSPITAL 74 - JERFT | Age: 33
Discharge: HOME | End: 2023-05-25
Payer: COMMERCIAL

## 2023-05-25 VITALS — BODY MASS INDEX: 34.4 KG/M2

## 2023-05-25 VITALS
HEART RATE: 82 BPM | TEMPERATURE: 98.1 F | DIASTOLIC BLOOD PRESSURE: 81 MMHG | SYSTOLIC BLOOD PRESSURE: 123 MMHG | RESPIRATION RATE: 18 BRPM

## 2023-05-25 VITALS
SYSTOLIC BLOOD PRESSURE: 157 MMHG | TEMPERATURE: 97.9 F | DIASTOLIC BLOOD PRESSURE: 109 MMHG | HEART RATE: 77 BPM | RESPIRATION RATE: 20 BRPM

## 2023-05-25 VITALS — BODY MASS INDEX: 32.8 KG/M2

## 2023-05-25 DIAGNOSIS — K08.89: Primary | ICD-10-CM

## 2023-05-25 DIAGNOSIS — K02.9: ICD-10-CM

## 2023-05-25 PROCEDURE — 3E0233Z INTRODUCTION OF ANTI-INFLAMMATORY INTO MUSCLE, PERCUTANEOUS APPROACH: ICD-10-PCS

## 2023-05-25 PROCEDURE — 3E0T3BZ INTRODUCTION OF ANESTHETIC AGENT INTO PERIPHERAL NERVES AND PLEXI, PERCUTANEOUS APPROACH: ICD-10-PCS

## 2023-06-04 ENCOUNTER — HOSPITAL ENCOUNTER (EMERGENCY)
Dept: HOSPITAL 74 - JER | Age: 33
Discharge: HOME | End: 2023-06-04
Payer: COMMERCIAL

## 2023-06-04 VITALS — BODY MASS INDEX: 34.4 KG/M2

## 2023-06-04 VITALS
TEMPERATURE: 98.2 F | DIASTOLIC BLOOD PRESSURE: 69 MMHG | HEART RATE: 77 BPM | SYSTOLIC BLOOD PRESSURE: 108 MMHG | RESPIRATION RATE: 18 BRPM

## 2023-06-04 DIAGNOSIS — Z11.52: Primary | ICD-10-CM
